# Patient Record
Sex: MALE | Race: WHITE | NOT HISPANIC OR LATINO | Employment: OTHER | URBAN - METROPOLITAN AREA
[De-identification: names, ages, dates, MRNs, and addresses within clinical notes are randomized per-mention and may not be internally consistent; named-entity substitution may affect disease eponyms.]

---

## 2023-03-10 PROBLEM — M54.6 DORSALGIA OF CERVICOTHORACIC REGION: Status: ACTIVE | Noted: 2023-03-10

## 2023-03-10 PROBLEM — R22.32 SUBCUTANEOUS MASS OF FINGER OF LEFT HAND: Status: ACTIVE | Noted: 2023-03-10

## 2023-03-10 PROBLEM — M99.07 SEGMENTAL AND SOMATIC DYSFUNCTION OF UPPER EXTREMITY: Status: ACTIVE | Noted: 2023-03-10

## 2023-03-10 PROBLEM — R42 LIGHTHEADEDNESS: Status: ACTIVE | Noted: 2023-03-10

## 2023-03-10 PROBLEM — M54.2 DORSALGIA OF CERVICOTHORACIC REGION: Status: ACTIVE | Noted: 2023-03-10

## 2023-03-10 PROBLEM — G25.81 RLS (RESTLESS LEGS SYNDROME): Status: ACTIVE | Noted: 2023-03-10

## 2023-03-10 PROBLEM — F41.9 ANXIETY: Status: ACTIVE | Noted: 2023-03-10

## 2023-03-10 PROBLEM — R53.83 MALAISE AND FATIGUE: Status: ACTIVE | Noted: 2023-03-10

## 2023-03-10 PROBLEM — M79.645 PAIN OF FINGER OF LEFT HAND: Status: ACTIVE | Noted: 2023-03-10

## 2023-03-10 PROBLEM — M67.441 GANGLION CYST OF FINGER OF RIGHT HAND: Status: ACTIVE | Noted: 2023-03-10

## 2023-03-10 PROBLEM — G47.39 COMPLEX SLEEP APNEA SYNDROME: Status: ACTIVE | Noted: 2023-03-10

## 2023-03-10 PROBLEM — R09.A2 GLOBUS SENSATION: Status: ACTIVE | Noted: 2023-03-10

## 2023-03-10 PROBLEM — R20.2 TINGLING SENSATION IN FACE: Status: ACTIVE | Noted: 2023-03-10

## 2023-03-10 PROBLEM — M99.08 SEGMENTAL AND SOMATIC DYSFUNCTION OF RIB CAGE: Status: ACTIVE | Noted: 2023-03-10

## 2023-03-10 PROBLEM — J30.2 SEASONAL ALLERGIES: Status: ACTIVE | Noted: 2023-03-10

## 2023-03-10 PROBLEM — R53.81 MALAISE AND FATIGUE: Status: ACTIVE | Noted: 2023-03-10

## 2023-03-10 PROBLEM — J32.9 RHINOSINUSITIS: Status: ACTIVE | Noted: 2023-03-10

## 2023-03-10 PROBLEM — M99.02 SEGMENTAL AND SOMATIC DYSFUNCTION OF THORACIC REGION: Status: ACTIVE | Noted: 2023-03-10

## 2023-03-10 PROBLEM — E55.9 VITAMIN D DEFICIENCY: Status: ACTIVE | Noted: 2023-03-10

## 2023-03-10 PROBLEM — R07.89 CHEST PRESSURE: Status: ACTIVE | Noted: 2023-03-10

## 2023-03-10 PROBLEM — E53.8 VITAMIN B12 DEFICIENCY: Status: ACTIVE | Noted: 2023-03-10

## 2023-03-10 PROBLEM — R40.0 DAYTIME SOMNOLENCE: Status: ACTIVE | Noted: 2023-03-10

## 2023-03-10 PROBLEM — G47.31 COMPLEX SLEEP APNEA SYNDROME: Status: ACTIVE | Noted: 2023-03-10

## 2023-03-10 PROBLEM — R51.9 CHRONIC DAILY HEADACHE: Status: ACTIVE | Noted: 2023-03-10

## 2023-03-10 PROBLEM — G47.33 OBSTRUCTIVE SLEEP APNEA: Status: ACTIVE | Noted: 2023-03-10

## 2023-03-10 PROBLEM — F43.9 STRESS: Status: ACTIVE | Noted: 2023-03-10

## 2023-03-10 PROBLEM — R42 DIZZINESS: Status: ACTIVE | Noted: 2023-03-10

## 2023-03-10 PROBLEM — I48.0 PAROXYSMAL ATRIAL FIBRILLATION (MULTI): Status: ACTIVE | Noted: 2023-03-10

## 2023-03-10 PROBLEM — R51.9 PRESSURE IN HEAD: Status: ACTIVE | Noted: 2023-03-10

## 2023-03-10 PROBLEM — F41.0 PANIC ATTACKS: Status: ACTIVE | Noted: 2023-03-10

## 2023-03-10 PROBLEM — K21.9 GASTROESOPHAGEAL REFLUX DISEASE: Status: ACTIVE | Noted: 2023-03-10

## 2023-03-10 PROBLEM — R00.2 PALPITATIONS: Status: ACTIVE | Noted: 2023-03-10

## 2023-03-10 PROBLEM — R94.31 ABNORMAL EKG: Status: ACTIVE | Noted: 2023-03-10

## 2023-03-10 PROBLEM — R73.03 PREDIABETES: Status: ACTIVE | Noted: 2023-03-10

## 2023-03-10 PROBLEM — E06.3 HASHIMOTO'S THYROIDITIS: Status: ACTIVE | Noted: 2023-03-10

## 2023-03-10 PROBLEM — B35.1 ONYCHOMYCOSIS: Status: ACTIVE | Noted: 2023-03-10

## 2023-03-10 PROBLEM — R13.19 ESOPHAGEAL DYSPHAGIA: Status: ACTIVE | Noted: 2023-03-10

## 2023-03-10 RX ORDER — OMEPRAZOLE 40 MG/1
40 CAPSULE, DELAYED RELEASE ORAL 2 TIMES DAILY
COMMUNITY
End: 2023-12-19

## 2023-03-10 RX ORDER — LEVOTHYROXINE SODIUM 112 UG/1
112 TABLET ORAL DAILY
COMMUNITY
End: 2023-03-16 | Stop reason: SDUPTHER

## 2023-03-10 RX ORDER — CICLOPIROX 80 MG/ML
SOLUTION TOPICAL DAILY
COMMUNITY
Start: 2022-03-21 | End: 2023-09-25 | Stop reason: ALTCHOICE

## 2023-03-13 ENCOUNTER — TELEPHONE (OUTPATIENT)
Dept: PRIMARY CARE | Facility: CLINIC | Age: 53
End: 2023-03-13
Payer: MEDICAID

## 2023-03-13 NOTE — TELEPHONE ENCOUNTER
Spoke to patient. He sees you on 3/16 but wanted to know if you are willing to order a celiac disease panel? He would like to try and get it before seeing you but if he can't he is willing to discuss at visit. Dr Hoff said it may be a good idea to order but patient was told to ask us about it

## 2023-03-16 ENCOUNTER — OFFICE VISIT (OUTPATIENT)
Dept: PRIMARY CARE | Facility: CLINIC | Age: 53
End: 2023-03-16
Payer: MEDICAID

## 2023-03-16 VITALS
SYSTOLIC BLOOD PRESSURE: 118 MMHG | HEIGHT: 70 IN | TEMPERATURE: 97.5 F | WEIGHT: 202 LBS | BODY MASS INDEX: 28.92 KG/M2 | DIASTOLIC BLOOD PRESSURE: 78 MMHG | HEART RATE: 95 BPM | OXYGEN SATURATION: 95 %

## 2023-03-16 DIAGNOSIS — R14.0 BLOATED ABDOMEN: Primary | ICD-10-CM

## 2023-03-16 DIAGNOSIS — E55.9 VITAMIN D DEFICIENCY: ICD-10-CM

## 2023-03-16 DIAGNOSIS — E06.3 HASHIMOTO'S THYROIDITIS: ICD-10-CM

## 2023-03-16 PROCEDURE — 99214 OFFICE O/P EST MOD 30 MIN: CPT | Performed by: FAMILY MEDICINE

## 2023-03-16 PROCEDURE — 1036F TOBACCO NON-USER: CPT | Performed by: FAMILY MEDICINE

## 2023-03-16 PROCEDURE — 3008F BODY MASS INDEX DOCD: CPT | Performed by: FAMILY MEDICINE

## 2023-03-16 RX ORDER — LEVOTHYROXINE SODIUM 112 UG/1
112 TABLET ORAL DAILY
Qty: 30 TABLET | Refills: 3 | Status: SHIPPED | OUTPATIENT
Start: 2023-03-16 | End: 2023-07-10

## 2023-03-16 RX ORDER — ERGOCALCIFEROL 1.25 MG/1
50000 CAPSULE ORAL
COMMUNITY
Start: 2022-12-14 | End: 2023-03-16 | Stop reason: SDUPTHER

## 2023-03-16 RX ORDER — ERGOCALCIFEROL 1.25 MG/1
50000 CAPSULE ORAL
Qty: 30 CAPSULE | Refills: 3 | Status: SHIPPED | OUTPATIENT
Start: 2023-03-16 | End: 2024-05-28

## 2023-03-16 ASSESSMENT — PATIENT HEALTH QUESTIONNAIRE - PHQ9
1. LITTLE INTEREST OR PLEASURE IN DOING THINGS: NOT AT ALL
SUM OF ALL RESPONSES TO PHQ9 QUESTIONS 1 AND 2: 0
2. FEELING DOWN, DEPRESSED OR HOPELESS: NOT AT ALL

## 2023-03-16 NOTE — PROGRESS NOTES
Subjective   Patient ID: Julian Juarez is a 52 y.o. male who presents for Follow-up (Had tests done and still having problems, talk about some tests and having bloodwork done. refills).  HPI  Reviewed CT angiogram, Colonoscopy, EGD and Barium swallow.   Getting bloating. Worse after pizza.    Taking acid reflux medication.    Omeprazole 40 mg and told to take 2 x week  Review of Systems    Objective   Physical Exam    Assessment/Plan   Problem List Items Addressed This Visit    None

## 2023-03-18 ENCOUNTER — LAB (OUTPATIENT)
Dept: LAB | Facility: LAB | Age: 53
End: 2023-03-18
Payer: MEDICAID

## 2023-03-18 DIAGNOSIS — R14.0 BLOATED ABDOMEN: ICD-10-CM

## 2023-03-18 PROCEDURE — 86003 ALLG SPEC IGE CRUDE XTRC EA: CPT

## 2023-03-18 PROCEDURE — 36415 COLL VENOUS BLD VENIPUNCTURE: CPT

## 2023-03-18 PROCEDURE — 86001 ALLERGEN SPECIFIC IGG: CPT

## 2023-03-18 PROCEDURE — 83516 IMMUNOASSAY NONANTIBODY: CPT

## 2023-03-19 LAB
DEAMIDATED GLIADIN PEPTIDE IGA: <1 U/ML (ref 0–14)
DEAMIDATED GLIADIN PEPTIDE IGG: <1 U/ML (ref 0–14)
TISSUE TRANSGLUTAMINASE IGG: <1 U/ML (ref 0–14)
TISSUE TRANSGLUTAMINASE, IGA: <1 U/ML (ref 0–14)

## 2023-03-20 ENCOUNTER — TELEPHONE (OUTPATIENT)
Dept: PRIMARY CARE | Facility: CLINIC | Age: 53
End: 2023-03-20
Payer: MEDICAID

## 2023-03-20 NOTE — TELEPHONE ENCOUNTER
----- Message from Guevara Varghese DO sent at 3/19/2023  7:49 PM EDT -----  Celiac panel negative

## 2023-03-21 LAB
ALLERGEN FOOD: WHEAT (TRITICUM AESTIVUM) IGE (KU/L): <0.1 KU/L
IMMUNOCAP INTERPRETATION: NORMAL

## 2023-03-22 LAB — Lab: 24.3 MCG/ML

## 2023-07-09 DIAGNOSIS — E06.3 HASHIMOTO'S THYROIDITIS: ICD-10-CM

## 2023-07-10 RX ORDER — LEVOTHYROXINE SODIUM 112 UG/1
112 TABLET ORAL DAILY
Qty: 30 TABLET | Refills: 11 | Status: SHIPPED | OUTPATIENT
Start: 2023-07-10 | End: 2024-07-09

## 2023-09-18 PROBLEM — R63.4 WEIGHT LOSS: Status: ACTIVE | Noted: 2023-09-18

## 2023-09-18 PROBLEM — M79.673 FOOT PAIN: Status: ACTIVE | Noted: 2023-09-18

## 2023-09-18 PROBLEM — M72.2 PLANTAR FASCIITIS: Status: ACTIVE | Noted: 2023-09-18

## 2023-09-18 PROBLEM — K30 FUNCTIONAL DYSPEPSIA: Status: ACTIVE | Noted: 2023-09-18

## 2023-09-18 PROBLEM — M94.0 COSTOCHONDRITIS: Status: ACTIVE | Noted: 2023-09-18

## 2023-09-18 PROBLEM — R90.89 ABNORMAL BRAIN MRI: Status: ACTIVE | Noted: 2023-09-18

## 2023-09-18 PROBLEM — R68.81 EARLY SATIETY: Status: ACTIVE | Noted: 2023-09-18

## 2023-09-18 PROBLEM — M77.30 CALCANEAL SPUR: Status: ACTIVE | Noted: 2023-09-18

## 2023-09-18 PROBLEM — R10.13 EPIGASTRIC PAIN: Status: ACTIVE | Noted: 2023-09-18

## 2023-09-18 PROBLEM — R07.89 CHEST PAIN, MUSCULOSKELETAL: Status: ACTIVE | Noted: 2023-09-18

## 2023-09-18 RX ORDER — NAPROXEN 500 MG/1
1 TABLET ORAL 2 TIMES DAILY
COMMUNITY
Start: 2023-02-27 | End: 2023-09-25 | Stop reason: ALTCHOICE

## 2023-09-18 RX ORDER — METOPROLOL SUCCINATE 50 MG/1
TABLET, EXTENDED RELEASE ORAL
COMMUNITY
Start: 2023-02-09 | End: 2023-09-25 | Stop reason: ALTCHOICE

## 2023-09-25 ENCOUNTER — OFFICE VISIT (OUTPATIENT)
Dept: PRIMARY CARE | Facility: CLINIC | Age: 53
End: 2023-09-25
Payer: MEDICAID

## 2023-09-25 VITALS
HEART RATE: 92 BPM | SYSTOLIC BLOOD PRESSURE: 124 MMHG | OXYGEN SATURATION: 95 % | BODY MASS INDEX: 30.21 KG/M2 | DIASTOLIC BLOOD PRESSURE: 68 MMHG | HEIGHT: 70 IN | WEIGHT: 211 LBS

## 2023-09-25 DIAGNOSIS — E55.9 VITAMIN D DEFICIENCY: ICD-10-CM

## 2023-09-25 DIAGNOSIS — R73.03 PREDIABETES: ICD-10-CM

## 2023-09-25 DIAGNOSIS — Z00.00 WELL ADULT EXAM: Primary | ICD-10-CM

## 2023-09-25 DIAGNOSIS — E06.3 HASHIMOTO'S THYROIDITIS: ICD-10-CM

## 2023-09-25 DIAGNOSIS — F41.9 ANXIETY: ICD-10-CM

## 2023-09-25 PROCEDURE — 99396 PREV VISIT EST AGE 40-64: CPT | Performed by: FAMILY MEDICINE

## 2023-09-25 PROCEDURE — 3008F BODY MASS INDEX DOCD: CPT | Performed by: FAMILY MEDICINE

## 2023-09-25 PROCEDURE — 1036F TOBACCO NON-USER: CPT | Performed by: FAMILY MEDICINE

## 2023-09-25 ASSESSMENT — PATIENT HEALTH QUESTIONNAIRE - PHQ9
SUM OF ALL RESPONSES TO PHQ9 QUESTIONS 1 AND 2: 0
1. LITTLE INTEREST OR PLEASURE IN DOING THINGS: NOT AT ALL
2. FEELING DOWN, DEPRESSED OR HOPELESS: NOT AT ALL

## 2023-09-25 NOTE — PROGRESS NOTES
Subjective   Patient ID: Julian Juarez is a 53 y.o. male who presents for well exam  HPI  COVID about a month ago.    Concerns today:  Wart right hand    In general the patient states that his health is:  Good.     Regular dental visits: Yes and getting scheduled.    Vision problems: No changes in the vision  Hearing loss:  No changes    Diet:  He is trying to get back to a good diet  Exercise:  Has been doing a little  Weight concerns:  yes  Sleep:  Yes  Caffeine:  None  Water: Water intake is good    Tobacco use:  Alcohol use:  Illicit drug use:    Reproductive health:  Sexually active: yes  Urinary difficulties: none.  Sees urology Dr Lujan    Colon cancer screening:  Last done: 2023  Colonoscopy     10 year follow up    Review of Systems    Objective   Physical Exam  CN II general: Patient is alert and oriented ×3 and appears in no acute distress. No respiratory distress.    Head: Atraumatic normocephalic.    Eyes: EOMI, PERRLA      Ears: Canals patent without any irritation, tympanic membranes without inflammation, no swelling, normal light reflex.    Nose: Nares patent. Turbinates are not swollen. No discharge.    Mouth: Normal mucosa. Moist. No erythema, exudates, tonsillar enlargement.    Neck: Normal range of motion, no masses.  Thyroid is palpable and normal in size without any nodules. No anterior cervical or posterior cervical adenopathy.    Heart: Regular rate and rhythm, no murmurs clicks or gallops    Lungs: Clear to auscultation bilaterally without any rhonchi rales or wheezing, lung sounds heard throughout all lung fields    Abdomen: Soft, nontender, no rigidity, rebound, guarding or organomegaly. Bowel sounds ×4 quadrants.    Musculoskeletal: Normal range of motion, strength is grossly intact in the proximal distal muscles of the upper and lower extremities bilaterally, deep tendon reflexes +2 out of 4 and symmetric bilaterally at the patella, Achilles, biceps, triceps, sensation  intact.    Nerves: Cranial nerves II through XII appear grossly intact and without deficit    Skin: Intact, dry, no rashes or erythema    Psych: Normal affect.   Assessment/Plan   Problem List Items Addressed This Visit       Anxiety    Relevant Orders    Referral to [facility]    Hashimoto's thyroiditis    Relevant Orders    TSH with reflex to Free T4 if abnormal    Prediabetes    Relevant Orders    CBC and Auto Differential    Comprehensive Metabolic Panel    Lipid Panel    Vitamin B12    Vitamin D deficiency    Relevant Orders    Vitamin D 1,25 Dihydroxy (for eval of hypercalcemia)     Other Visit Diagnoses       Well adult exam    -  Primary        Cardiology Dr. Jessica - [facility]  Urology Dr. Lujan- Yearly prostate exams and hematuria follow up.  Has couple small kidney stones.    GI: [place] - Functional dyspepsia.  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  Just following  Wart on the right hand.  - Use baby aspirin nightly.  Crush and add a driop of water to make a paste

## 2023-09-27 ENCOUNTER — LAB (OUTPATIENT)
Dept: LAB | Facility: LAB | Age: 53
End: 2023-09-27
Payer: MEDICAID

## 2023-09-27 DIAGNOSIS — E55.9 VITAMIN D DEFICIENCY: ICD-10-CM

## 2023-09-27 DIAGNOSIS — E06.3 HASHIMOTO'S THYROIDITIS: ICD-10-CM

## 2023-09-27 DIAGNOSIS — R73.03 PREDIABETES: ICD-10-CM

## 2023-09-27 LAB
ALANINE AMINOTRANSFERASE (SGPT) (U/L) IN SER/PLAS: 19 U/L (ref 10–52)
ALBUMIN (G/DL) IN SER/PLAS: 4.2 G/DL (ref 3.4–5)
ALKALINE PHOSPHATASE (U/L) IN SER/PLAS: 59 U/L (ref 33–120)
ANION GAP IN SER/PLAS: 12 MMOL/L (ref 10–20)
ASPARTATE AMINOTRANSFERASE (SGOT) (U/L) IN SER/PLAS: 17 U/L (ref 9–39)
BASOPHILS (10*3/UL) IN BLOOD BY AUTOMATED COUNT: 0.05 X10E9/L (ref 0–0.1)
BASOPHILS/100 LEUKOCYTES IN BLOOD BY AUTOMATED COUNT: 1.1 % (ref 0–2)
BILIRUBIN TOTAL (MG/DL) IN SER/PLAS: 0.8 MG/DL (ref 0–1.2)
CALCIUM (MG/DL) IN SER/PLAS: 8.8 MG/DL (ref 8.6–10.3)
CARBON DIOXIDE, TOTAL (MMOL/L) IN SER/PLAS: 26 MMOL/L (ref 21–32)
CHLORIDE (MMOL/L) IN SER/PLAS: 103 MMOL/L (ref 98–107)
CHOLESTEROL (MG/DL) IN SER/PLAS: 199 MG/DL (ref 0–199)
CHOLESTEROL IN HDL (MG/DL) IN SER/PLAS: 66.1 MG/DL
CHOLESTEROL/HDL RATIO: 3
COBALAMIN (VITAMIN B12) (PG/ML) IN SER/PLAS: 542 PG/ML (ref 211–911)
CREATININE (MG/DL) IN SER/PLAS: 0.92 MG/DL (ref 0.5–1.3)
EOSINOPHILS (10*3/UL) IN BLOOD BY AUTOMATED COUNT: 0.06 X10E9/L (ref 0–0.7)
EOSINOPHILS/100 LEUKOCYTES IN BLOOD BY AUTOMATED COUNT: 1.3 % (ref 0–6)
ERYTHROCYTE DISTRIBUTION WIDTH (RATIO) BY AUTOMATED COUNT: 12.5 % (ref 11.5–14.5)
ERYTHROCYTE MEAN CORPUSCULAR HEMOGLOBIN CONCENTRATION (G/DL) BY AUTOMATED: 33.3 G/DL (ref 32–36)
ERYTHROCYTE MEAN CORPUSCULAR VOLUME (FL) BY AUTOMATED COUNT: 90 FL (ref 80–100)
ERYTHROCYTES (10*6/UL) IN BLOOD BY AUTOMATED COUNT: 5.03 X10E12/L (ref 4.5–5.9)
GFR MALE: >90 ML/MIN/1.73M2
GLUCOSE (MG/DL) IN SER/PLAS: 98 MG/DL (ref 74–99)
HEMATOCRIT (%) IN BLOOD BY AUTOMATED COUNT: 45.1 % (ref 41–52)
HEMOGLOBIN (G/DL) IN BLOOD: 15 G/DL (ref 13.5–17.5)
IMMATURE GRANULOCYTES/100 LEUKOCYTES IN BLOOD BY AUTOMATED COUNT: 0.9 % (ref 0–0.9)
LDL: 113 MG/DL (ref 0–99)
LEUKOCYTES (10*3/UL) IN BLOOD BY AUTOMATED COUNT: 4.5 X10E9/L (ref 4.4–11.3)
LYMPHOCYTES (10*3/UL) IN BLOOD BY AUTOMATED COUNT: 1.64 X10E9/L (ref 1.2–4.8)
LYMPHOCYTES/100 LEUKOCYTES IN BLOOD BY AUTOMATED COUNT: 36.9 % (ref 13–44)
MONOCYTES (10*3/UL) IN BLOOD BY AUTOMATED COUNT: 0.38 X10E9/L (ref 0.1–1)
MONOCYTES/100 LEUKOCYTES IN BLOOD BY AUTOMATED COUNT: 8.5 % (ref 2–10)
NEUTROPHILS (10*3/UL) IN BLOOD BY AUTOMATED COUNT: 2.28 X10E9/L (ref 1.2–7.7)
NEUTROPHILS/100 LEUKOCYTES IN BLOOD BY AUTOMATED COUNT: 51.3 % (ref 40–80)
PLATELETS (10*3/UL) IN BLOOD AUTOMATED COUNT: 357 X10E9/L (ref 150–450)
POTASSIUM (MMOL/L) IN SER/PLAS: 4 MMOL/L (ref 3.5–5.3)
PROTEIN TOTAL: 6.4 G/DL (ref 6.4–8.2)
SODIUM (MMOL/L) IN SER/PLAS: 137 MMOL/L (ref 136–145)
THYROTROPIN (MIU/L) IN SER/PLAS BY DETECTION LIMIT <= 0.05 MIU/L: 2.48 MIU/L (ref 0.44–3.98)
TRIGLYCERIDE (MG/DL) IN SER/PLAS: 101 MG/DL (ref 0–149)
UREA NITROGEN (MG/DL) IN SER/PLAS: 14 MG/DL (ref 6–23)
VLDL: 20 MG/DL (ref 0–40)

## 2023-09-27 PROCEDURE — 82607 VITAMIN B-12: CPT

## 2023-09-27 PROCEDURE — 84443 ASSAY THYROID STIM HORMONE: CPT

## 2023-09-27 PROCEDURE — 80061 LIPID PANEL: CPT

## 2023-09-27 PROCEDURE — 80053 COMPREHEN METABOLIC PANEL: CPT

## 2023-09-27 PROCEDURE — 82652 VIT D 1 25-DIHYDROXY: CPT

## 2023-09-27 PROCEDURE — 85025 COMPLETE CBC W/AUTO DIFF WBC: CPT

## 2023-09-27 PROCEDURE — 36415 COLL VENOUS BLD VENIPUNCTURE: CPT

## 2023-09-29 LAB — VITAMIN D 1,25-DIHYDROXY: 41.9 PG/ML (ref 19.9–79.3)

## 2023-10-02 ENCOUNTER — TELEPHONE (OUTPATIENT)
Dept: PRIMARY CARE | Facility: CLINIC | Age: 53
End: 2023-10-02
Payer: MEDICAID

## 2023-10-24 ENCOUNTER — TELEPHONE (OUTPATIENT)
Dept: CARDIOLOGY | Facility: CLINIC | Age: 53
End: 2023-10-24
Payer: MEDICAID

## 2023-10-24 DIAGNOSIS — I48.0 PAROXYSMAL ATRIAL FIBRILLATION (MULTI): Primary | ICD-10-CM

## 2023-10-24 NOTE — TELEPHONE ENCOUNTER
Patient called in with concern that he may be going in to afib at night during sleep. He states it wakes him up. Per Dr. Miller, the patient will have a 14 day monitor with follow up in office with results. The patient is agreeable to this plan. He will be in tomorrow for monitor placement and follow up with Dr. Miller 11/15.

## 2023-10-25 ENCOUNTER — APPOINTMENT (OUTPATIENT)
Dept: CARDIOLOGY | Facility: CLINIC | Age: 53
End: 2023-10-25
Payer: MEDICAID

## 2023-10-25 DIAGNOSIS — I48.91 ATRIAL FIBRILLATION, UNSPECIFIED TYPE (MULTI): ICD-10-CM

## 2023-10-26 ENCOUNTER — CLINICAL SUPPORT (OUTPATIENT)
Dept: CARDIOLOGY | Facility: CLINIC | Age: 53
End: 2023-10-26
Payer: MEDICAID

## 2023-10-26 DIAGNOSIS — I48.0 PAROXYSMAL ATRIAL FIBRILLATION (MULTI): ICD-10-CM

## 2023-11-09 ENCOUNTER — APPOINTMENT (OUTPATIENT)
Dept: CARDIOLOGY | Facility: CLINIC | Age: 53
End: 2023-11-09
Payer: MEDICAID

## 2023-11-10 ENCOUNTER — CLINICAL SUPPORT (OUTPATIENT)
Dept: CARDIOLOGY | Facility: CLINIC | Age: 53
End: 2023-11-10
Payer: MEDICAID

## 2023-11-10 VITALS — SYSTOLIC BLOOD PRESSURE: 138 MMHG | DIASTOLIC BLOOD PRESSURE: 100 MMHG

## 2023-11-10 DIAGNOSIS — I48.91 ATRIAL FIBRILLATION, UNSPECIFIED TYPE (MULTI): ICD-10-CM

## 2023-11-10 PROBLEM — E66.811 OBESITY, CLASS I, BMI 30-34.9: Status: ACTIVE | Noted: 2023-07-28

## 2023-11-10 PROBLEM — R73.01 IMPAIRED FASTING GLUCOSE: Status: ACTIVE | Noted: 2018-05-24

## 2023-11-10 PROBLEM — R53.83 FATIGUE: Status: ACTIVE | Noted: 2018-04-30

## 2023-11-10 PROBLEM — E03.9 HYPOTHYROIDISM: Status: ACTIVE | Noted: 2018-04-30

## 2023-11-10 PROBLEM — E66.9 OBESITY, CLASS I, BMI 30-34.9: Status: ACTIVE | Noted: 2023-07-28

## 2023-11-10 PROCEDURE — 93000 ELECTROCARDIOGRAM COMPLETE: CPT | Performed by: INTERNAL MEDICINE

## 2023-11-10 RX ORDER — DILTIAZEM HYDROCHLORIDE 240 MG/1
240 CAPSULE, COATED, EXTENDED RELEASE ORAL DAILY
COMMUNITY
End: 2023-11-16 | Stop reason: WASHOUT

## 2023-11-10 NOTE — PROGRESS NOTES
Patient came in for EKG for Dr. Miller. Patient reports he is awakened at night with a fluttering or gasping for breath. He just finished wearing a holter monitor and turned it in yesterday.   Around 4 am today he was awakened again and his heart rate was 130s-160s for about 30 minutes. He wasn't sure if he was just anxious by being awakened to this or not. He said he has sleep apnea and just started treatment for this. EKG normal sinus rhythm and I gave EKG to Kathie.   Next appointment 11/15/23 with Dr. Miller.  /100

## 2023-11-14 PROBLEM — N32.0 BLADDER NECK OBSTRUCTION: Status: ACTIVE | Noted: 2022-07-22

## 2023-11-14 RX ORDER — PERMETHRIN 50 MG/G
CREAM TOPICAL
COMMUNITY
Start: 2023-10-14 | End: 2023-11-16 | Stop reason: WASHOUT

## 2023-11-14 NOTE — PROGRESS NOTES
[facility] SLEEP MEDICINE CLINIC  FOLLOW-UP VISIT NOTE    Clinic Location: [facility]  Service Date: 11/14/2023  PCP: Guevara Varghese DO    HISTORY OF PRESENT ILLNESS     Patient ID: Julian Juarez is a 53 y.o. male who presents for follow-up  of complex sleep apnea on BPAP-S/T .     Patient is here {pxarrival:87256}.  To review, patient's medical history is notable for ***.     Interval History  Patient was last seen in 8/17/2023. Plan was to  resume PAP use and order for PAP supplies placed .  {Interval sleep history:53091}  He denies symptoms of parasomnias and shift-work disorder.     RLS Followup:   ***    SLEEP STUDY HISTORY (personally reviewed raw data such as interpretation report, data sheet, hypnogram, and titration table if available and applicable)  HSAT 9/21/2020: AHI 14.4, SpO2 nirmal 85%  PAP titration 5/7/2022: CPAP was started at 4-12 cm H2O. Pt was having problems tolerating mask. Due to central apneas more than 50%, BPAP-S/T was attempted at 14/10 cm H2O and BUR 15. Central apneas persisted and occurred during sleep wake transition and post-arousal. No CSB noted but some have presumed ataxic breathing pattern. Intermittent mask leak while on Airtouch N20 nasal mask.     PAP Adherence  A PAP adherence data was obtained and reviewed personally today in clinic. (see scanned document in EPIC)  Source of data: {data source:62342}  Machine: {machine brand:03921}  Setup date: ***  Settings: {machine settings:14485}  Date Range: *** to ***  Days used: ***  >4 hrs usage: ***%  Average usage hours (days used): ***  Pressure: {machine pressures:05611}  Leak: {mask leak:83734}  AHI: *** (RERA index ***, ROBBY ***)    SLEEP-WAKE SCHEDULE  Bedtime: 12 MN daily  Sleep latency: 5-60 mins  Difficulty falling asleep: {Yes/No:45556}  wakings: none unles woke up gaspiing for air  Difficulty staying asleep: {Yes/No:57143}  Final wakeup time: 5:30 AM on Mondays, Wednesdays, and  "Fridays; 7 AM on Tuesdays and Thursdays; 8 AM on weekends  Get out of bed time: 5:30 AM on Mondays, Wednesdays, and Fridays; 7 AM on Tuesdays and Thursdays; 8 AM on weekends  Shift work: no  Naps: no  Average sleep duration (excluding naps): 6-7 hrs    SLEEP ENVIRONMENT  Sleep location: ***  Sleep status: {sleep status:18052}  Preferred sleep position: {Sleep position:15846}  TV in bedroom: {Yes/No:37896}  Room is dark:  {Yes/No:24999::\"Yes\"}  Room is quiet: {Yes/No:92749::\"Yes\"}  Room is cool: {Yes/No:53567::\"Yes\"}    SOCIAL HISTORY  Smoking: no  ETOH: no  Marijuana: no  Caffeine: no  Sleep aids: no    WEIGHT: {weight:97957}    Claustrophobia: Yes     Active Problems, Allergy List, Medication List, and PMH/PSH/FH/Social Hx have been reviewed and reconciled in chart. No significant changes unless documented in the pertinent chart section. Updates made when necessary.     PHYSICAL EXAM     VITAL SIGNS: There were no vitals taken for this visit.    NECK CIRCUMFERENCE: ***    Today ESS: ***  Last visit ESS: 12  NIMESH: 5    Physical Exam  Constitutional: Awake, not in distress  Head: normocephalic, atraumatic  Skin: Warm, ***no rash  Neuro: No tremors, moves all extremities  Psych: alert and oriented to time, place, and person    RESULTS/DATA     No results found for: \"IRON\", \"TRANSFERRIN\", \"IRONSAT\", \"TIBC\", \"FERRITIN\"    Bicarbonate   Date Value Ref Range Status   09/27/2023 26 21 - 32 mmol/L Final       ASSESSMENT/PLAN     Assessment/Plan   {Assess/PlanSmartLinks:33194}    All of patient's questions were answered. He verbalizes understanding and agreement with my assessment and plan. Refer to after-visit-summary (AVS) for patient education and more details on troubleshooting issues with PAP usage, proper cleaning instructions of PAP supplies, and usual recommended replacement schedule for each of the PAP supplies.     {EWFOLLOW-UP:21719}  "

## 2023-11-15 ENCOUNTER — OFFICE VISIT (OUTPATIENT)
Dept: CARDIOLOGY | Facility: CLINIC | Age: 53
End: 2023-11-15
Payer: MEDICAID

## 2023-11-15 VITALS
HEIGHT: 70 IN | BODY MASS INDEX: 32.35 KG/M2 | WEIGHT: 226 LBS | HEART RATE: 95 BPM | DIASTOLIC BLOOD PRESSURE: 74 MMHG | SYSTOLIC BLOOD PRESSURE: 120 MMHG

## 2023-11-15 DIAGNOSIS — R00.2 PALPITATIONS: Primary | ICD-10-CM

## 2023-11-15 PROCEDURE — 93000 ELECTROCARDIOGRAM COMPLETE: CPT | Performed by: INTERNAL MEDICINE

## 2023-11-15 PROCEDURE — 1036F TOBACCO NON-USER: CPT | Performed by: INTERNAL MEDICINE

## 2023-11-15 PROCEDURE — 99214 OFFICE O/P EST MOD 30 MIN: CPT | Performed by: INTERNAL MEDICINE

## 2023-11-15 PROCEDURE — 3008F BODY MASS INDEX DOCD: CPT | Performed by: INTERNAL MEDICINE

## 2023-11-15 NOTE — PROGRESS NOTES
[facility] Heart and Vascular Electrophysiology    Patient Name: Julian Juarez  Patient : 1970    Referred  for   Chief Complaint   Patient presents with    Follow-up        Julian Juarez is a 53 y.o. year old male patient with:    1. Paroxysmal atrial fibrillation: For the past 15 years. Around 10-15 episodes in that time. Episodes last between 5 minutes to 8 hours. Usually occur at night or early in the morning. Feels palpitations, fluttering in the heart and some lightheadedness. Was treated with cardizem for years, but not consistently. Episodes continue to be infrequent. CHADSVasc: 0     2. Mild obstructive sleep apnea        Past Medical History:  He has a past medical history of Dizziness and giddiness (2022), Personal history of other diseases of the circulatory system, and Personal history of other endocrine, nutritional and metabolic disease.    Past Surgical History:  He has a past surgical history that includes CT angio coronary art with heartflow if score >30% (2022) and CT angio coronary art with heartflow if score >30% (2023).      Social History:  He reports that he has never smoked. He has never used smokeless tobacco. He reports that he does not currently use alcohol. He reports that he does not use drugs.    Family History:  Family History   Problem Relation Name Age of Onset    Diabetes Mother      Other (bilateral renal calculus) Mother      Sleep apnea Brother      Diabetes Paternal Grandmother      Other (bilateral renal calculus) Paternal Grandmother          Allergies:  Patient has no known allergies.    Outpatient Medications:  Current Outpatient Medications   Medication Instructions    ergocalciferol (VITAMIN D-2) 50,000 Units, oral, Weekly    levothyroxine (SYNTHROID, LEVOXYL) 112 mcg, oral, Daily    omeprazole (PRILOSEC) 40 mg, oral, 2 times daily        ROS:  A 14 point review of systems was done and is negative other than as stated in  "HPI    Vitals:  Vitals:    11/15/23 1422   BP: 120/74   Pulse: 95   Weight: 103 kg (226 lb)   Height: 1.778 m (5' 10\")       Physical Exam:   Physical Exam  Constitutional:       General: He is not in acute distress.     Appearance: Normal appearance.   Cardiovascular:      Rate and Rhythm: Normal rate and regular rhythm.   Musculoskeletal:         General: No swelling.   Skin:     General: Skin is warm and dry.   Neurological:      Mental Status: He is alert.          Intake/Output:   No intake/output data recorded.    Outpatient Medications  Current Outpatient Medications on File Prior to Visit   Medication Sig Dispense Refill    ergocalciferol (Vitamin D-2) 1.25 MG (09695 UT) capsule Take 1 capsule (50,000 Units) by mouth 1 (one) time per week. 30 capsule 3    levothyroxine (Synthroid, Levoxyl) 112 mcg tablet Take 1 tablet (112 mcg) by mouth once daily. 30 tablet 11    omeprazole (PriLOSEC) 40 mg DR capsule Take 1 capsule (40 mg) by mouth 2 times a day.       No current facility-administered medications on file prior to visit.       Labs: (past 26 weeks)  Recent Results (from the past 4368 hour(s))   CBC and Auto Differential    Collection Time: 09/27/23 10:15 AM   Result Value Ref Range    WBC 4.5 4.4 - 11.3 x10E9/L    RBC 5.03 4.50 - 5.90 x10E12/L    Hemoglobin 15.0 13.5 - 17.5 g/dL    Hematocrit 45.1 41.0 - 52.0 %    MCV 90 80 - 100 fL    MCHC 33.3 32.0 - 36.0 g/dL    Platelets 357 150 - 450 x10E9/L    RDW 12.5 11.5 - 14.5 %    Neutrophils % 51.3 40.0 - 80.0 %    Immature Granulocytes %, Automated 0.9 0.0 - 0.9 %    Lymphocytes % 36.9 13.0 - 44.0 %    Monocytes % 8.5 2.0 - 10.0 %    Eosinophils % 1.3 0.0 - 6.0 %    Basophils % 1.1 0.0 - 2.0 %    Neutrophils Absolute 2.28 1.20 - 7.70 x10E9/L    Lymphocytes Absolute 1.64 1.20 - 4.80 x10E9/L    Monocytes Absolute 0.38 0.10 - 1.00 x10E9/L    Eosinophils Absolute 0.06 0.00 - 0.70 x10E9/L    Basophils Absolute 0.05 0.00 - 0.10 x10E9/L   Comprehensive Metabolic Panel "    Collection Time: 09/27/23 10:15 AM   Result Value Ref Range    Glucose 98 74 - 99 mg/dL    Sodium 137 136 - 145 mmol/L    Potassium 4.0 3.5 - 5.3 mmol/L    Chloride 103 98 - 107 mmol/L    Bicarbonate 26 21 - 32 mmol/L    Anion Gap 12 10 - 20 mmol/L    Urea Nitrogen 14 6 - 23 mg/dL    Creatinine 0.92 0.50 - 1.30 mg/dL    GFR MALE >90 >90 mL/min/1.73m2    Calcium 8.8 8.6 - 10.3 mg/dL    Albumin 4.2 3.4 - 5.0 g/dL    Alkaline Phosphatase 59 33 - 120 U/L    Total Protein 6.4 6.4 - 8.2 g/dL    AST 17 9 - 39 U/L    Total Bilirubin 0.8 0.0 - 1.2 mg/dL    ALT (SGPT) 19 10 - 52 U/L   Lipid Panel    Collection Time: 09/27/23 10:15 AM   Result Value Ref Range    Cholesterol 199 0 - 199 mg/dL    HDL 66.1 mg/dL    Cholesterol/HDL Ratio 3.0      (H) 0 - 99 mg/dL    VLDL 20 0 - 40 mg/dL    Triglycerides 101 0 - 149 mg/dL   TSH with reflex to Free T4 if abnormal    Collection Time: 09/27/23 10:15 AM   Result Value Ref Range    TSH 2.48 0.44 - 3.98 mIU/L   Vitamin B12    Collection Time: 09/27/23 10:15 AM   Result Value Ref Range    Vitamin B-12 542 211 - 911 pg/mL   Vitamin D 1,25 Dihydroxy (for eval of hypercalcemia)    Collection Time: 09/27/23 10:15 AM   Result Value Ref Range    Vit D, 1,25-Dihydroxy 41.9 19.9 - 79.3 pg/mL   Transthoracic Echo (TTE) Complete    Collection Time: 12/15/23  8:24 AM   Result Value Ref Range    AV pk carly 0.90     LVOT diam 2.28     LV biplane EF 56     MV avg E/e' ratio 5.14     MV E/A ratio 0.91     LA vol index A/L 26.6     Tricuspid annular plane systolic excursion 2.3     RV free wall pk S' 13.00     LVIDd 4.94     RVSP 13.6     Aortic Valve Area by Continuity of Peak Velocity 3.54     AV pk grad 3.2     LV A4C EF 53.0        ECG  Encounter Date: 11/15/23   ECG 12 lead (Clinic Performed)    Narrative    See scanned report       Echocardiogram  No results found for this or any previous visit from the past 1095 days.      Assessment/Plan:   Patient claims he continues to have occasional  episodes of palpitations only at night which usually wake him up. At times he feels his heart pounding heavily but also at times it feels like his heart racing. Reviewed event monitor, some PAC's noted. Also noted somewhat elevated HR during the night ~90-100bpm. Appears to be sinus tachycardia. No AF detected. Patient scheduled to receive new CPAP equipment. He will call us to see how his palpitations are doing after he's on CPAP for a few weeks. He will continue to follow with Dr. Jessica.

## 2023-11-16 ENCOUNTER — APPOINTMENT (OUTPATIENT)
Dept: SLEEP MEDICINE | Facility: CLINIC | Age: 53
End: 2023-11-16
Payer: MEDICAID

## 2023-11-20 ENCOUNTER — TELEPHONE (OUTPATIENT)
Dept: CARDIOLOGY | Facility: CLINIC | Age: 53
End: 2023-11-20
Payer: MEDICAID

## 2023-11-20 NOTE — TELEPHONE ENCOUNTER
Next appointment with Dr. Jessica 2/19/24  Last appointment: 2/9/23 with Dr. Jessica and [facility] is significant for anxiety, GERD, Hashimoto's thyroiditis, mild SHERMAN, palpitations, and paroxysmal a-fib  Testing    02/02/2022 - TTE  The left ventricular systolic function is normal with a 55% estimated ejection fraction.     01/31/2022 - CTA Coronary Arteries  Normal coronary anatomy without evidence of atherosclerotic changes or stenotic disease.    Recently saw Dr. Miller 11/15/23    **I called and spoke with patient and he reports he jogged up about 5 flights of stairs and when he got to the top he was winded and took 1-2 minutes to go back to baseline. He didn't have chest pain, pressure, or heaviness. He doesn't get shortness of breath with other daily activities but admits he may be out of shape. He doesn't normally go up 5 flights, so I recommended to continue to monitor for symptoms and call back with any new symptoms or if he notices shortness of breath progressing. He verbalized understanding.

## 2023-11-20 NOTE — TELEPHONE ENCOUNTER
----- Message from Maria R Abarca sent at 11/20/2023 10:59 AM EST -----  Patient called concerned that he went up some steps today .He states he was going pretty fast but he was winded after and it made him a little uneasy. Could you call him to let him know if this is normal or if he should be seen. His number is [phone]. Thank you.

## 2023-12-06 ENCOUNTER — TELEPHONE (OUTPATIENT)
Dept: CARDIOLOGY | Facility: CLINIC | Age: 53
End: 2023-12-06
Payer: MEDICAID

## 2023-12-06 DIAGNOSIS — R06.02 SHORTNESS OF BREATH: Primary | ICD-10-CM

## 2023-12-08 ENCOUNTER — HOSPITAL ENCOUNTER (OUTPATIENT)
Dept: RADIOLOGY | Facility: HOSPITAL | Age: 53
Discharge: HOME | End: 2023-12-08
Payer: MEDICAID

## 2023-12-08 DIAGNOSIS — R06.02 SHORTNESS OF BREATH: ICD-10-CM

## 2023-12-08 PROCEDURE — 71046 X-RAY EXAM CHEST 2 VIEWS: CPT

## 2023-12-08 NOTE — TELEPHONE ENCOUNTER
I called and left a voicemail for patient to call back to office.    Last visit: 2/9/23 with Dr. Jessica--Ordered CTA Coronaries  Next visit: 1/2/24 with Dr. Jessica    2/26/23 CTA Chest:   IMPRESSION:  1. Mild dependent bilateral lower lobe atelectasis/edema. No  consolidations, sizeable effusions or pneumothorax. Specifically, no  evidence for acute pulmonary embolic disease, aortic aneurysm or  dissection is seen.  
I called and spoke with patient and went over plan for testing and appointment 1/2/24. He verbalized understanding. I let him know he can walk in to radiology department to have chxray done. Orders placed. I let Alea know echo needs scheduled. Patient denies questions.  
Patient called back and noticed that he is getting winded  with exertion. He was carrying a TV up 1 flight of steps and was winded. He called a couple weeks ago because noticed that he took 5 flights of stairs pretty fast and was out of breath so he continued to monitor. He doesn't notice shortness of breath with walking or normal activity, but thought he should mention this. He denies chest pain/chest pressure or heaviness. He does admit he has gained some weight. I let him know I would update Dr. Jessica to see if any testing is needed.  
Patient left a voicemail that he has been having some issues with shortness of breath. Requesting call back to discuss.  
What Type Of Note Output Would You Prefer (Optional)?: Bullet Format
How Severe Is Your Skin Lesion?: mild
Has Your Skin Lesion Been Treated?: not been treated
Is This A New Presentation, Or A Follow-Up?: Skin Lesions
Additional History: Small white dots

## 2023-12-11 ENCOUNTER — TELEPHONE (OUTPATIENT)
Dept: CARDIOLOGY | Facility: CLINIC | Age: 53
End: 2023-12-11
Payer: MEDICAID

## 2023-12-11 NOTE — TELEPHONE ENCOUNTER
----- Message from Bryson Jessica MD sent at 12/11/2023  7:23 AM EST -----  Not sure why we ordered the CXR but looks ok

## 2023-12-15 ENCOUNTER — TELEPHONE (OUTPATIENT)
Dept: CARDIOLOGY | Facility: CLINIC | Age: 53
End: 2023-12-15

## 2023-12-15 ENCOUNTER — HOSPITAL ENCOUNTER (OUTPATIENT)
Dept: CARDIOLOGY | Facility: CLINIC | Age: 53
Discharge: HOME | End: 2023-12-15
Payer: MEDICAID

## 2023-12-15 DIAGNOSIS — R06.02 SHORTNESS OF BREATH: ICD-10-CM

## 2023-12-15 LAB
AORTIC VALVE PEAK VELOCITY: 0.9
AV PEAK GRADIENT: 3.2
AVA (PEAK VEL): 3.54
EJECTION FRACTION APICAL 4 CHAMBER: 53
EJECTION FRACTION: 56
LEFT ATRIUM VOLUME AREA LENGTH INDEX BSA: 26.6
LEFT VENTRICLE INTERNAL DIMENSION DIASTOLE: 4.94 (ref 3.5–6)
LEFT VENTRICULAR OUTFLOW TRACT DIAMETER: 2.28
MITRAL VALVE E/A RATIO: 0.91
MITRAL VALVE E/E' RATIO: 5.14
RIGHT VENTRICLE FREE WALL PEAK S': 13
RIGHT VENTRICLE PEAK SYSTOLIC PRESSURE: 13.6
TRICUSPID ANNULAR PLANE SYSTOLIC EXCURSION: 2.3

## 2023-12-15 PROCEDURE — 93306 TTE W/DOPPLER COMPLETE: CPT

## 2023-12-15 PROCEDURE — 93306 TTE W/DOPPLER COMPLETE: CPT | Performed by: INTERNAL MEDICINE

## 2023-12-15 NOTE — TELEPHONE ENCOUNTER
Patient left a voicemail that he had an echo today, and has a question about it. Requesting call back.

## 2023-12-17 DIAGNOSIS — K21.9 GASTROESOPHAGEAL REFLUX DISEASE, UNSPECIFIED WHETHER ESOPHAGITIS PRESENT: Primary | ICD-10-CM

## 2023-12-18 NOTE — TELEPHONE ENCOUNTER
I called and spoke with patient and went over echo result. Patient has appt 1/2/24 with Dr. Jessica

## 2023-12-19 RX ORDER — OMEPRAZOLE 40 MG/1
40 CAPSULE, DELAYED RELEASE ORAL DAILY
Qty: 90 CAPSULE | Refills: 0 | Status: SHIPPED | OUTPATIENT
Start: 2023-12-19

## 2023-12-22 ENCOUNTER — APPOINTMENT (OUTPATIENT)
Dept: CARDIOLOGY | Facility: HOSPITAL | Age: 53
End: 2023-12-22
Payer: MEDICAID

## 2023-12-30 ENCOUNTER — HOSPITAL ENCOUNTER (EMERGENCY)
Facility: HOSPITAL | Age: 53
Discharge: HOME | End: 2023-12-30
Payer: MEDICAID

## 2023-12-30 ENCOUNTER — APPOINTMENT (OUTPATIENT)
Dept: RADIOLOGY | Facility: HOSPITAL | Age: 53
End: 2023-12-30
Payer: MEDICAID

## 2023-12-30 VITALS
SYSTOLIC BLOOD PRESSURE: 142 MMHG | DIASTOLIC BLOOD PRESSURE: 89 MMHG | OXYGEN SATURATION: 96 % | TEMPERATURE: 99.1 F | WEIGHT: 225 LBS | RESPIRATION RATE: 16 BRPM | BODY MASS INDEX: 32.21 KG/M2 | HEART RATE: 85 BPM | HEIGHT: 70 IN

## 2023-12-30 DIAGNOSIS — S63.501A WRIST SPRAIN, RIGHT, INITIAL ENCOUNTER: Primary | ICD-10-CM

## 2023-12-30 PROCEDURE — 73110 X-RAY EXAM OF WRIST: CPT | Mod: RT

## 2023-12-30 PROCEDURE — 99283 EMERGENCY DEPT VISIT LOW MDM: CPT

## 2023-12-30 PROCEDURE — 73110 X-RAY EXAM OF WRIST: CPT | Mod: RIGHT SIDE | Performed by: STUDENT IN AN ORGANIZED HEALTH CARE EDUCATION/TRAINING PROGRAM

## 2023-12-30 RX ORDER — NAPROXEN 500 MG/1
500 TABLET ORAL
Qty: 14 TABLET | Refills: 0 | Status: SHIPPED | OUTPATIENT
Start: 2023-12-30 | End: 2024-01-06

## 2023-12-30 ASSESSMENT — COLUMBIA-SUICIDE SEVERITY RATING SCALE - C-SSRS
6. HAVE YOU EVER DONE ANYTHING, STARTED TO DO ANYTHING, OR PREPARED TO DO ANYTHING TO END YOUR LIFE?: NO
2. HAVE YOU ACTUALLY HAD ANY THOUGHTS OF KILLING YOURSELF?: NO
1. IN THE PAST MONTH, HAVE YOU WISHED YOU WERE DEAD OR WISHED YOU COULD GO TO SLEEP AND NOT WAKE UP?: NO

## 2023-12-30 ASSESSMENT — PAIN DESCRIPTION - ORIENTATION: ORIENTATION: RIGHT

## 2023-12-30 ASSESSMENT — LIFESTYLE VARIABLES
REASON UNABLE TO ASSESS: YES
HAVE YOU EVER FELT YOU SHOULD CUT DOWN ON YOUR DRINKING: NO
EVER FELT BAD OR GUILTY ABOUT YOUR DRINKING: NO
HAVE PEOPLE ANNOYED YOU BY CRITICIZING YOUR DRINKING: NO
EVER HAD A DRINK FIRST THING IN THE MORNING TO STEADY YOUR NERVES TO GET RID OF A HANGOVER: NO

## 2023-12-30 ASSESSMENT — PAIN - FUNCTIONAL ASSESSMENT: PAIN_FUNCTIONAL_ASSESSMENT: 0-10

## 2023-12-30 ASSESSMENT — VISUAL ACUITY: OU: 1

## 2023-12-30 ASSESSMENT — PAIN DESCRIPTION - LOCATION: LOCATION: WRIST

## 2023-12-30 ASSESSMENT — PAIN SCALES - GENERAL: PAINLEVEL_OUTOF10: 6

## 2023-12-30 ASSESSMENT — PAIN DESCRIPTION - PAIN TYPE: TYPE: ACUTE PAIN

## 2023-12-30 NOTE — PROGRESS NOTES
"Counseling:  The patient was counseled regarding diagnostic results, instructions for management, risk factor reductions, prognosis, patient and family education, impressions, risks and benefits of treatment options and importance of compliance with treatment.      Chief Complaint:   The patient presents today for annual followup of angina and a-fib, and for evaluation of SOB s/p CXR and echocardiogram.      History Of Present Illness:    Julian Juarez is a 53 year old male patient who presents today for annual followup of angina, and for evaluation of SOB s/p CXR and echocardiogram. His PMH is significant for anxiety, GERD, Hashimoto's thyroiditis, mild SHERMAN, palpitations, and paroxysmal a-fib. The patient initially contacted our office on 11/20/2023 to report SOB after quickly ascending 5 flights of stairs, which he normally does not do. He was advised to monitor his symptoms and to call back with persistent or worsening symptoms. On 12/06/2023, the patient called our office to report persistent exertional SOB, for which a CXR and echocardiogram were ordered. CXR performed 12/08/2023 was unremarkable. On 12/15/2023, echocardiogram demonstrated an EF of 55%. Today, the patient states that he is feeling well. He reports SOB with exertion which he now realizes is likely s/t deconditioning. He denies any CP or chest discomfort outside of symptoms related to GERD.      Last Recorded Vitals:  Vitals:    01/02/24 1140   BP: 120/86   BP Location: Left arm   Pulse: 79   Weight: 102 kg (225 lb)   Height: 1.778 m (5' 10\")       Past Surgical History:  He has a past surgical history that includes CT angio coronary art with heartflow if score >30% (1/31/2022) and CT angio coronary art with heartflow if score >30% (2/24/2023).      Social History:  He reports that he has never smoked. He has never used smokeless tobacco. He reports that he does not currently use alcohol. He reports that he does not use drugs.    Family " History:  Family History   Problem Relation Name Age of Onset    Diabetes Mother      Other (bilateral renal calculus) Mother      Sleep apnea Brother      Diabetes Paternal Grandmother      Other (bilateral renal calculus) Paternal Grandmother          Allergies:  Patient has no known allergies.    Outpatient Medications:  Current Outpatient Medications   Medication Instructions    ergocalciferol (VITAMIN D-2) 50,000 Units, oral, Weekly    fluticasone (Flonase) 50 mcg/actuation nasal spray     levothyroxine (SYNTHROID, LEVOXYL) 112 mcg, oral, Daily    naproxen (NAPROSYN) 500 mg, oral, 2 times daily with meals    omeprazole (PRILOSEC) 40 mg, oral, Daily     Review of Systems   Respiratory:  Positive for shortness of breath.    All other systems reviewed and are negative.     Physical Exam:  Constitutional:       Appearance: Healthy appearance. Not in distress.   Neck:      Vascular: No JVR. JVD normal.   Pulmonary:      Effort: Pulmonary effort is normal.      Breath sounds: Normal breath sounds. No wheezing. No rhonchi. No rales.   Chest:      Chest wall: Not tender to palpatation.   Cardiovascular:      PMI at left midclavicular line. Normal rate. Regular rhythm. Normal S1. Normal S2.       Murmurs: There is no murmur.      No gallop.  No click. No rub.   Pulses:     Intact distal pulses.   Edema:     Peripheral edema absent.   Abdominal:      General: Bowel sounds are normal.      Palpations: Abdomen is soft.      Tenderness: There is no abdominal tenderness.   Musculoskeletal: Normal range of motion.         General: No tenderness. Skin:     General: Skin is warm and dry.   Neurological:      General: No focal deficit present.      Mental Status: Alert and oriented to person, place and time.          Last Labs:  CBC -  Lab Results   Component Value Date    WBC 4.5 09/27/2023    HGB 15.0 09/27/2023    HCT 45.1 09/27/2023    MCV 90 09/27/2023     09/27/2023       CMP -  Lab Results   Component Value Date     CALCIUM 8.8 09/27/2023    PROT 6.4 09/27/2023    ALBUMIN 4.2 09/27/2023    AST 17 09/27/2023    ALT 19 09/27/2023    ALKPHOS 59 09/27/2023    BILITOT 0.8 09/27/2023       LIPID PANEL -   Lab Results   Component Value Date    CHOL 199 09/27/2023    TRIG 101 09/27/2023    HDL 66.1 09/27/2023    CHHDL 3.0 09/27/2023    LDLF 113 (H) 09/27/2023    VLDL 20 09/27/2023       RENAL FUNCTION PANEL -   Lab Results   Component Value Date    GLUCOSE 98 09/27/2023     09/27/2023    K 4.0 09/27/2023     09/27/2023    CO2 26 09/27/2023    ANIONGAP 12 09/27/2023    BUN 14 09/27/2023    CREATININE 0.92 09/27/2023    GFRMALE >90 09/27/2023    CALCIUM 8.8 09/27/2023    ALBUMIN 4.2 09/27/2023        Lab Results   Component Value Date    BNP 14 02/26/2023    HGBA1C 5.7 05/31/2023       Last Cardiology Tests:  12/15/2023 - TTE  1. Left ventricular systolic function is normal with a 55% estimated ejection fraction.  2. Spectral Doppler shows an impaired relaxation pattern of left ventricular diastolic filling.    10/26/2023 to 11/09/2023 - Holter Monitor  1. Predominant underlying rhythm was sinus rhythm; min HR 44 bpm, max  bpm, avg HR 81 bpm.  2. 1 run of supraventricular tachycardia occurred; 4 beats, max rate 193 bpm.    02/23/2023 - CTA Coronary Arteries  Mild luminal irregularities in the coronary arteries as detailed above without significant luminal stenosis.    05/25/2022 to 06/10/2022 - Event Monitoring  1. Baseline rhythm was sinus with average HR 82 bpm.  2. Rare PACs/PVCs.  3. One atrial run consisting of 5 beats.  4. There was one episode of narrow complex rhythm  bpm with slight rate variation - favored to be sinus tachycardia, however supraventricular tachycardia not entirely excluded. Episode was asymptomatic.  5. Patient symptoms correlated with mostly sinus rhythm, rarely sinus bradycardia HR 54, or sinus tachycardia HR up to 123 bpm.     03/29/2022 to 03/30/2022 - Event Monitoring  Primary rhythm  "was sinus rhythm; avg HR 91 bpm, min HR 72 bpm, max  bpm.     02/02/2022 - TTE  The left ventricular systolic function is normal with a 55% estimated ejection fraction.     01/31/2022 - CTA Coronary Arteries  Normal coronary anatomy without evidence of atherosclerotic changes or stenotic disease.     10/07/2021 to 11/27/2021 - Event Monitoring  1. Baseline transmission showing sinus rhythm with a heart rate of 75 bpm.   2. PAC burden of less than 1%.   3. PVC burden of 1%.  4. 39 patient triggered events with reported skipped heartbeat, lightheadedness, or \"other\" correlating with sinus rhythm, sinus rhythm with PACs/PVCs, sinus tachycardia, sinus tachycardia with PACs/PVCs, or PSVT.      06/03/2021 - CT Cardiac Scoring  1. Total 2.  2. The visualized ascending thoracic aorta measures 3.4 cm in diameter.  3. The heart is normal in size. No pericardial effusion is present.  4. No gross evidence of mediastinal or hilar lymphadenopathy or masses is identified. The visualized segments of the lungs are normally expanded.  5. The main pulmonary artery, right and left pulmonary artery are normal in size.  6. The visualized subdiaphragmatic structures appear intact.     09/16/2020 to 10/15/2020 - Event Monitoring  1. 53 events were transmitted; 33 symptomatic, 20 device triggered.  2. SVT occurred 1 time with fastest run 138 bpm.  3. PAC burden of <1%.  4. PVC burden of 1%.     Lab review: I have personally reviewed the laboratory result(s)     Assessment/Plan   1) Chest Pain Suggestive of Angina  Stress echo in past negative   CTA coronary arteries 02/23/2023 with mild luminal irregularities without significant luminal stenosis  TTE 12/15/2023 with LVEF 55%  F/U 1 year    2) Exertional SOB  Patient contacted our office 11/20/2023 to report SOB after quickly ascending 5 flights of stairs - advised to monitor symptoms and to call back with persistent or worsening symptoms  Patient contacted our office 12/06/2023 to " report persistent exertional SOB - CXR and TTE ordered  CXR 12/08/2023 unremarkable  TTE 12/15/2023 with LVEF 55%   Reports SOB with exertion  - likely s/t deconditioning  Denies CP or discomfort outside of symptoms related to GERD  Advised to increase physical activity and follow a Mediterranean style diet to promote weight loss  F/U 1 year      3) Atrial Fibrillation   Patient contacted Dr. Miller's office 10/24/2023 to report ?a-fib at night during sleep - Holter ordered  Holter with 1 run of SVT, PACs, somewhat elevated HR (sinus tachycardia), no AF detected   EKG 11/10/2023 NSR  Seen by Dr. Miller 11/15/2023 - occasional episodes of palpitations at night, scheduled to receive new CPAP equipment.  F/U 1 year      Scribe Attestation  By signing my name below, I, Radha Ley Scrtanna   attest that this documentation has been prepared under the direction and in the presence of Bryson Jessica MD.

## 2023-12-31 NOTE — ED PROVIDER NOTES
HPI   Chief Complaint   Patient presents with    Wrist Pain     Right wrist       Patient presents to the emergency department for evaluation of wrist pain that began yesterday.  Patient is right-hand dominant was using his right wrist to beat on a metal rotor continuously for a prolonged period of time.  Since then he has noticed pain on the radial aspect of the wrist as well as swelling.  He took a dose of ibuprofen with persistence of his discomfort causing him to seek evaluation today.  He denies a history of fracture or problems to this area in the past.  He has not noticed any redness, ecchymosis or open wound since his discomfort.  He denies any limited range of motion of his hand or fingers or his elbow secondary to this.  His symptoms are aggravated by palpation and movement.  His symptoms are mild to moderate in severity and persistent nature.      History provided by:  Patient   used: No                          Jessica Coma Scale Score: 15                  Patient History   Past Medical History:   Diagnosis Date    Dizziness and giddiness 07/21/2022    Lightheadedness    Personal history of other diseases of the circulatory system     History of atrial fibrillation    Personal history of other endocrine, nutritional and metabolic disease     History of Hashimoto thyroiditis     Past Surgical History:   Procedure Laterality Date    CT ANGIO CORONARY ART WITH HEARTFLOW IF SCORE >30%  1/31/2022    CT HEART CORONARY ANGIOGRAM 1/31/2022 [facility]    CT ANGIO CORONARY ART WITH HEARTFLOW IF SCORE >30%  2/24/2023    CT HEART CORONARY ANGIOGRAM [facility] CT     Family History   Problem Relation Name Age of Onset    Diabetes Mother      Other (bilateral renal calculus) Mother      Sleep apnea Brother      Diabetes Paternal Grandmother      Other (bilateral renal calculus) Paternal Grandmother       Social History     Tobacco Use    Smoking status: Never    Smokeless tobacco: Never  "  Substance Use Topics    Alcohol use: Not Currently    Drug use: Never       Physical Exam   Visit Vitals  /89   Pulse 85   Temp 37.3 °C (99.1 °F) (Temporal)   Resp 16   Ht 1.778 m (5' 10\")   Wt 102 kg (225 lb)   SpO2 96%   BMI 32.28 kg/m²   Smoking Status Never   BSA 2.24 m²      Physical Exam  Vitals reviewed.   Constitutional:       Appearance: Normal appearance.   HENT:      Head: Normocephalic and atraumatic.      Right Ear: Hearing normal.      Left Ear: Hearing normal.      Nose: Nose normal.      Mouth/Throat:      Lips: Pink.      Mouth: Mucous membranes are moist.   Eyes:      General: Vision grossly intact.   Cardiovascular:      Rate and Rhythm: Normal rate and regular rhythm.   Pulmonary:      Effort: Pulmonary effort is normal.      Breath sounds: Normal breath sounds.   Musculoskeletal:      Cervical back: Normal range of motion and neck supple.      Comments: Strong right radial and ulnar pulses.  Normal capillary refill.  There is no bony tenderness to the right elbow and there is full flexion and extension.  Patient has no pain with supination and pronation.  There is tenderness at the distal radius with mild swelling and no crepitus, erythema, or ecchymosis.  No open wound.  He has no tenderness at the anatomic snuffbox nor tenderness on the ulnar aspect.  No tenderness through palpation of the hand or fingers.  Full sensation and motor movement intact along the radial, median and ulnar nerve distribution. Neurovascularly intact with compartments soft.     Skin:     General: Skin is warm and dry.      Capillary Refill: Capillary refill takes less than 2 seconds.      Findings: No rash.   Neurological:      Mental Status: He is alert and oriented to person, place, and time.      Sensory: Sensation is intact.      Motor: Motor function is intact.      Coordination: Coordination is intact.      Gait: Gait is intact.   Psychiatric:         Behavior: Behavior is cooperative.         XR wrist " right 3+ views   Final Result   No evidence of fracture or malalignment in the right wrist.             MACRO:   None        Signed by: Jenny Wesley 12/30/2023 9:39 PM   Dictation workstation:   [id number]          Labs Reviewed - No data to display      ED Course & MDM   Diagnoses as of 12/30/23 2211   Wrist sprain, right, initial encounter           Medical Decision Making  Patient presents to the ED for evaluation of right wrist pain. Differential diagnosis of fracture, sprain and contusion to mention a few. Plan is for x-ray and patient declines need for pain medication while in ED.    Imaging as interpreted by radiologist negative for acute findings as documented above. Plan is subsequently for symptom control with Naprosyn 500 mg BID with meals, ice, velcro wrist splint and with appropriate outpatient follow-up with orthopedics should symptoms persist after 10 to 14 days of conservative measures as provided on their discharge handout. Patient is educated on S/S to watch for indicative of re-evaluation in the ER setting including worsening of current symptoms not responding to the treatment plan. Patient verbalized understanding of instructions and is amenable to this treatment plan. Patient departed in stable condition with no social determinants of health that would obscure this outpatient management plan.             Your medication list        START taking these medications        Instructions Last Dose Given Next Dose Due   naproxen 500 mg tablet  Commonly known as: Naprosyn      Take 1 tablet (500 mg) by mouth 2 times a day with meals for 7 days.              ASK your doctor about these medications        Instructions Last Dose Given Next Dose Due   ergocalciferol 1.25 MG (71737 UT) capsule  Commonly known as: Vitamin D-2      Take 1 capsule (50,000 Units) by mouth 1 (one) time per week.       levothyroxine 112 mcg tablet  Commonly known as: Synthroid, Levoxyl      Take 1 tablet (112 mcg) by  mouth once daily.       omeprazole 40 mg DR capsule  Commonly known as: PriLOSEC      Take 1 capsule by mouth once daily                 Where to Get Your Medications        These medications were sent to [id number] - [place], [place] - [address]  [address] [address]      Phone: [phone]   naproxen 500 mg tablet         Procedure  Procedures     *This report was transcribed using voice recognition software.  Every effort was made to ensure accuracy; however, inadvertent computerized transcription errors may be present.*  LINDA Pappas-LAZARO  12/30/23         Arleen Carolina, LINDA-LAZARO  12/30/23 2210

## 2023-12-31 NOTE — DISCHARGE INSTRUCTIONS
WRIST SPRAIN PATIENT INSTRUCTIONS:    What are common types of wrist injuries? -- Common types of wrist injuries include:  ?Sprains - A wrist sprain is when a ligament in the wrist tears or gets stretched too much. Ligaments are tough bands of tissue that connect bones to other bones.  ?Fractures - A “fracture” is another word for a broken bone. There are different kinds of wrist fractures. Some wrist fractures involve a break in one of the small bones in the wrist. Others involve a break in one of the forearm bones near the wrist. One common type of wrist fracture is called a “Colles’ fracture.” This fracture involves the end of one of the forearm bones. It can happen when a person falls onto his or her outstretched hand.    What are the symptoms of a wrist injury? -- Wrist injuries can cause:  ?Pain  ?Swelling  ?Bruising  ?Stiffness  ?Weakness in the wrist or arm  ?The arm or wrist to look bent    Sprains usually cause less pain and swelling than fractures, but not always.    Will I need tests? -- Probably. Your doctor or nurse will ask about your symptoms and do an exam. To check for a fracture, he or she will do an X-ray. He or she might also do an imaging test such as a CT or MRI scan. Imaging tests create pictures of the inside of the body.    How are wrist injuries treated? -- Treatment depends on the type of injury you have and how severe it is.    If you have a lot of pain or a severe injury, your doctor will prescribe a strong pain medicine. If your injury is mild, your doctor might recommend that you take an over-the-counter medicine for your pain. Over-the-counter medicines include acetaminophen (sample brand name: Tylenol), ibuprofen (sample brand names: Advil, Motrin), and naproxen (sample brand name: Aleve).    Treatment for a wrist sprain can include:  ?Resting your wrist  ?Putting ice on your wrist - To reduce swelling, you can put a cold gel pack, bag of ice, or bag of frozen vegetables on the  injured area every 1 to 2 hours, for 15 minutes each time. You should put a thin towel between the ice (or other cold object) and your skin. You should use the ice (or other cold object) for at least 6 hours after your injury. Some people find it helpful to ice longer, even up to 2 days after their injury.  ?Wearing an elastic bandage (such as an ACE wrap) or splint  ?Surgery - Most people with a wrist sprain do not need surgery. But people who tore a ligament might need surgery to fix the ligament.    A wrist fracture is usually treated with a splint or cast. But your doctor might wait for your swelling to get better before he or she puts the cast on your wrist. Swelling usually gets better in a few days. While you wait for the swelling to get better, your doctor will probably have you wear a splint.    Before your doctor puts the cast on your wrist, he or she will make sure that your bone is in the correct position. If your bone is not in the correct position, your doctor might do a procedure or surgery to put your bone in the correct position.     After your cast or splint comes off, your doctor might recommend that you see a physical therapist (exercise expert). The physical therapist can show you exercises and stretches to make your muscles stronger and help your joints move more easily.  How long do wrist injuries take to heal? -- It depends partly on the type of injury and how severe it is. A mild sprain takes about 1 to 2 weeks to heal. Fractures can take weeks to months to heal.     It also depends on the person. Healthy children usually heal very quickly. Older adults or adults with other medical problems can take much longer to heal.    Can I do anything to improve the healing process? -- Yes. It’s important to follow all of your doctor’s instructions while your sprain or fracture is healing. He or she will probably recommend that you eat a healthy diet that includes getting enough calcium, vitamin D, and  protein. He or she will also probably recommend that you avoid doing certain things.     For example, he or she might recommend that you:  ?Avoid doing certain physical activities.  ?Avoid smoking - A fracture can take longer to heal if you smoke.  ?Avoid damaging your cast or getting it wet, if you have a cast that shouldn’t get wet.  When should I call my doctor or nurse? -- Your doctor or nurse will tell you when to call him or her. In general, you should call your doctor or nurse if:  ?You have severe pain, or your pain or swelling gets worse.  ?You have numbness or tingling in your fingers, or your fingers look blue or purple.  ?You damage your cast or get it wet, and it’s not supposed to get wet.    INFORMATION FROM UP TO DATE - ALL RIGHTS RESERVED

## 2023-12-31 NOTE — ED TRIAGE NOTES
Pt presents with right wrist pain and swelling after hammering the rotors off of a vehicle. States worsening pain with movement

## 2024-01-02 ENCOUNTER — OFFICE VISIT (OUTPATIENT)
Dept: CARDIOLOGY | Facility: CLINIC | Age: 54
End: 2024-01-02
Payer: MEDICAID

## 2024-01-02 VITALS
BODY MASS INDEX: 32.21 KG/M2 | HEIGHT: 70 IN | DIASTOLIC BLOOD PRESSURE: 86 MMHG | HEART RATE: 79 BPM | SYSTOLIC BLOOD PRESSURE: 120 MMHG | WEIGHT: 225 LBS

## 2024-01-02 DIAGNOSIS — I48.0 PAROXYSMAL ATRIAL FIBRILLATION (MULTI): Primary | ICD-10-CM

## 2024-01-02 PROCEDURE — 3008F BODY MASS INDEX DOCD: CPT | Performed by: INTERNAL MEDICINE

## 2024-01-02 PROCEDURE — 1036F TOBACCO NON-USER: CPT | Performed by: INTERNAL MEDICINE

## 2024-01-02 PROCEDURE — 93000 ELECTROCARDIOGRAM COMPLETE: CPT | Performed by: INTERNAL MEDICINE

## 2024-01-02 PROCEDURE — 99213 OFFICE O/P EST LOW 20 MIN: CPT | Performed by: INTERNAL MEDICINE

## 2024-01-02 RX ORDER — FLUTICASONE PROPIONATE 50 MCG
SPRAY, SUSPENSION (ML) NASAL
COMMUNITY
Start: 2023-12-28

## 2024-01-02 ASSESSMENT — ENCOUNTER SYMPTOMS
DEPRESSION: 0
OCCASIONAL FEELINGS OF UNSTEADINESS: 0
LOSS OF SENSATION IN FEET: 0
SHORTNESS OF BREATH: 1

## 2024-01-02 NOTE — LETTER
January 2, 2024     Guevara Varghese DO  [address] [facility] [address]    Patient: Julian Juarez   YOB: 1970   Date of Visit: 1/2/2024       Dear Dr. Gueavra Varghese DO:    Thank you for referring Julian Juarez to me for evaluation. Below are my notes for this consultation.  If you have questions, please do not hesitate to call me. I look forward to following your patient along with you.       Sincerely,     Bryson Jessica MD      CC: No Recipients  ______________________________________________________________________________________    Counseling:  The patient was counseled regarding diagnostic results, instructions for management, risk factor reductions, prognosis, patient and family education, impressions, risks and benefits of treatment options and importance of compliance with treatment.      Chief Complaint:   The patient presents today for annual followup of angina and a-fib, and for evaluation of SOB s/p CXR and echocardiogram.      History Of Present Illness:    Julian Juarez is a 53 year old male patient who presents today for annual followup of angina, and for evaluation of SOB s/p CXR and echocardiogram. His PMH is significant for anxiety, GERD, Hashimoto's thyroiditis, mild SHERMAN, palpitations, and paroxysmal a-fib. The patient initially contacted our office on 11/20/2023 to report SOB after quickly ascending 5 flights of stairs, which he normally does not do. He was advised to monitor his symptoms and to call back with persistent or worsening symptoms. On 12/06/2023, the patient called our office to report persistent exertional SOB, for which a CXR and echocardiogram were ordered. CXR performed 12/08/2023 was unremarkable. On 12/15/2023, echocardiogram demonstrated an EF of 55%. Today, the patient states that he is feeling well. He reports SOB with exertion which he now realizes is likely s/t deconditioning. He denies any CP or chest discomfort outside of symptoms related to GERD.   "    Last Recorded Vitals:  Vitals:    01/02/24 1140   BP: 120/86   BP Location: Left arm   Pulse: 79   Weight: 102 kg (225 lb)   Height: 1.778 m (5' 10\")       Past Surgical History:  He has a past surgical history that includes CT angio coronary art with heartflow if score >30% (1/31/2022) and CT angio coronary art with heartflow if score >30% (2/24/2023).      Social History:  He reports that he has never smoked. He has never used smokeless tobacco. He reports that he does not currently use alcohol. He reports that he does not use drugs.    Family History:  Family History   Problem Relation Name Age of Onset   • Diabetes Mother     • Other (bilateral renal calculus) Mother     • Sleep apnea Brother     • Diabetes Paternal Grandmother     • Other (bilateral renal calculus) Paternal Grandmother          Allergies:  Patient has no known allergies.    Outpatient Medications:  Current Outpatient Medications   Medication Instructions   • ergocalciferol (VITAMIN D-2) 50,000 Units, oral, Weekly   • fluticasone (Flonase) 50 mcg/actuation nasal spray    • levothyroxine (SYNTHROID, LEVOXYL) 112 mcg, oral, Daily   • naproxen (NAPROSYN) 500 mg, oral, 2 times daily with meals   • omeprazole (PRILOSEC) 40 mg, oral, Daily     Review of Systems   Respiratory:  Positive for shortness of breath.    All other systems reviewed and are negative.     Physical Exam:  Constitutional:       Appearance: Healthy appearance. Not in distress.   Neck:      Vascular: No JVR. JVD normal.   Pulmonary:      Effort: Pulmonary effort is normal.      Breath sounds: Normal breath sounds. No wheezing. No rhonchi. No rales.   Chest:      Chest wall: Not tender to palpatation.   Cardiovascular:      PMI at left midclavicular line. Normal rate. Regular rhythm. Normal S1. Normal S2.       Murmurs: There is no murmur.      No gallop.  No click. No rub.   Pulses:     Intact distal pulses.   Edema:     Peripheral edema absent.   Abdominal:      General: Bowel " sounds are normal.      Palpations: Abdomen is soft.      Tenderness: There is no abdominal tenderness.   Musculoskeletal: Normal range of motion.         General: No tenderness. Skin:     General: Skin is warm and dry.   Neurological:      General: No focal deficit present.      Mental Status: Alert and oriented to person, place and time.          Last Labs:  CBC -  Lab Results   Component Value Date    WBC 4.5 09/27/2023    HGB 15.0 09/27/2023    HCT 45.1 09/27/2023    MCV 90 09/27/2023     09/27/2023       CMP -  Lab Results   Component Value Date    CALCIUM 8.8 09/27/2023    PROT 6.4 09/27/2023    ALBUMIN 4.2 09/27/2023    AST 17 09/27/2023    ALT 19 09/27/2023    ALKPHOS 59 09/27/2023    BILITOT 0.8 09/27/2023       LIPID PANEL -   Lab Results   Component Value Date    CHOL 199 09/27/2023    TRIG 101 09/27/2023    HDL 66.1 09/27/2023    CHHDL 3.0 09/27/2023    LDLF 113 (H) 09/27/2023    VLDL 20 09/27/2023       RENAL FUNCTION PANEL -   Lab Results   Component Value Date    GLUCOSE 98 09/27/2023     09/27/2023    K 4.0 09/27/2023     09/27/2023    CO2 26 09/27/2023    ANIONGAP 12 09/27/2023    BUN 14 09/27/2023    CREATININE 0.92 09/27/2023    GFRMALE >90 09/27/2023    CALCIUM 8.8 09/27/2023    ALBUMIN 4.2 09/27/2023        Lab Results   Component Value Date    BNP 14 02/26/2023    HGBA1C 5.7 05/31/2023       Last Cardiology Tests:  12/15/2023 - TTE  1. Left ventricular systolic function is normal with a 55% estimated ejection fraction.  2. Spectral Doppler shows an impaired relaxation pattern of left ventricular diastolic filling.    10/26/2023 to 11/09/2023 - Holter Monitor  1. Predominant underlying rhythm was sinus rhythm; min HR 44 bpm, max  bpm, avg HR 81 bpm.  2. 1 run of supraventricular tachycardia occurred; 4 beats, max rate 193 bpm.    02/23/2023 - CTA Coronary Arteries  Mild luminal irregularities in the coronary arteries as detailed above without significant luminal  "stenosis.    05/25/2022 to 06/10/2022 - Event Monitoring  1. Baseline rhythm was sinus with average HR 82 bpm.  2. Rare PACs/PVCs.  3. One atrial run consisting of 5 beats.  4. There was one episode of narrow complex rhythm  bpm with slight rate variation - favored to be sinus tachycardia, however supraventricular tachycardia not entirely excluded. Episode was asymptomatic.  5. Patient symptoms correlated with mostly sinus rhythm, rarely sinus bradycardia HR 54, or sinus tachycardia HR up to 123 bpm.     03/29/2022 to 03/30/2022 - Event Monitoring  Primary rhythm was sinus rhythm; avg HR 91 bpm, min HR 72 bpm, max  bpm.     02/02/2022 - TTE  The left ventricular systolic function is normal with a 55% estimated ejection fraction.     01/31/2022 - CTA Coronary Arteries  Normal coronary anatomy without evidence of atherosclerotic changes or stenotic disease.     10/07/2021 to 11/27/2021 - Event Monitoring  1. Baseline transmission showing sinus rhythm with a heart rate of 75 bpm.   2. PAC burden of less than 1%.   3. PVC burden of 1%.  4. 39 patient triggered events with reported skipped heartbeat, lightheadedness, or \"other\" correlating with sinus rhythm, sinus rhythm with PACs/PVCs, sinus tachycardia, sinus tachycardia with PACs/PVCs, or PSVT.      06/03/2021 - CT Cardiac Scoring  1. Total 2.  2. The visualized ascending thoracic aorta measures 3.4 cm in diameter.  3. The heart is normal in size. No pericardial effusion is present.  4. No gross evidence of mediastinal or hilar lymphadenopathy or masses is identified. The visualized segments of the lungs are normally expanded.  5. The main pulmonary artery, right and left pulmonary artery are normal in size.  6. The visualized subdiaphragmatic structures appear intact.     09/16/2020 to 10/15/2020 - Event Monitoring  1. 53 events were transmitted; 33 symptomatic, 20 device triggered.  2. SVT occurred 1 time with fastest run 138 bpm.  3. PAC burden of " <1%.  4. PVC burden of 1%.     Lab review: I have personally reviewed the laboratory result(s)     Assessment/Plan  1) Chest Pain Suggestive of Angina  Stress echo in past negative   CTA coronary arteries 02/23/2023 with mild luminal irregularities without significant luminal stenosis  TTE 12/15/2023 with LVEF 55%  F/U 1 year    2) Exertional SOB  Patient contacted our office 11/20/2023 to report SOB after quickly ascending 5 flights of stairs - advised to monitor symptoms and to call back with persistent or worsening symptoms  Patient contacted our office 12/06/2023 to report persistent exertional SOB - CXR and TTE ordered  CXR 12/08/2023 unremarkable  TTE 12/15/2023 with LVEF 55%   Reports SOB with exertion  - likely s/t deconditioning  Denies CP or discomfort outside of symptoms related to GERD  Advised to increase physical activity and follow a Mediterranean style diet to promote weight loss  F/U 1 year      3) Atrial Fibrillation   Patient contacted Dr. Miller's office 10/24/2023 to report ?a-fib at night during sleep - Holter ordered  Holter with 1 run of SVT, PACs, somewhat elevated HR (sinus tachycardia), no AF detected   EKG 11/10/2023 NSR  Seen by Dr. Miller 11/15/2023 - occasional episodes of palpitations at night, scheduled to receive new CPAP equipment.  F/U 1 year      Scribe Attestation  By signing my name below, I, Radha Ley Scribe   attest that this documentation has been prepared under the direction and in the presence of Bryson Jessica MD.

## 2024-01-02 NOTE — PATIENT INSTRUCTIONS
Continue all current medications as prescribed.   Dr. Jessiac has recommended increasing your physical activity and monitoring your diet. Watch carbohydrate intake (rice, potatoes, pasta, bread, ice-cream all within moderation); increase greens, veggies, fiber, lean protein (fish, turkey, chicken; baked/boiled/grilled, not fried) and fruit.   Followup with Dr. Jessica in 1 year, sooner should any issues or concerns arise before then.     If you have any questions or cardiac concerns, please call our office at [phone].

## 2024-01-18 ENCOUNTER — APPOINTMENT (OUTPATIENT)
Dept: SLEEP MEDICINE | Facility: CLINIC | Age: 54
End: 2024-01-18
Payer: MEDICAID

## 2024-01-30 ENCOUNTER — TELEPHONE (OUTPATIENT)
Dept: PRIMARY CARE | Facility: CLINIC | Age: 54
End: 2024-01-30
Payer: MEDICAID

## 2024-01-30 DIAGNOSIS — E06.3 HASHIMOTO'S THYROIDITIS: Primary | ICD-10-CM

## 2024-02-01 ENCOUNTER — APPOINTMENT (OUTPATIENT)
Dept: DERMATOLOGY | Facility: CLINIC | Age: 54
End: 2024-02-01
Payer: MEDICAID

## 2024-02-15 ENCOUNTER — LAB (OUTPATIENT)
Dept: LAB | Facility: LAB | Age: 54
End: 2024-02-15
Payer: MEDICAID

## 2024-02-15 DIAGNOSIS — E06.3 HASHIMOTO'S THYROIDITIS: ICD-10-CM

## 2024-02-15 LAB
ALBUMIN SERPL BCP-MCNC: 4 G/DL (ref 3.4–5)
ALP SERPL-CCNC: 62 U/L (ref 33–120)
ALT SERPL W P-5'-P-CCNC: 20 U/L (ref 10–52)
ANION GAP SERPL CALC-SCNC: 11 MMOL/L (ref 10–20)
AST SERPL W P-5'-P-CCNC: 18 U/L (ref 9–39)
BASOPHILS # BLD AUTO: 0.07 X10*3/UL (ref 0–0.1)
BASOPHILS NFR BLD AUTO: 1.3 %
BILIRUB SERPL-MCNC: 0.3 MG/DL (ref 0–1.2)
BUN SERPL-MCNC: 13 MG/DL (ref 6–23)
CALCIUM SERPL-MCNC: 8.8 MG/DL (ref 8.6–10.3)
CHLORIDE SERPL-SCNC: 105 MMOL/L (ref 98–107)
CO2 SERPL-SCNC: 28 MMOL/L (ref 21–32)
CREAT SERPL-MCNC: 0.82 MG/DL (ref 0.5–1.3)
EGFRCR SERPLBLD CKD-EPI 2021: >90 ML/MIN/1.73M*2
EOSINOPHIL # BLD AUTO: 0.11 X10*3/UL (ref 0–0.7)
EOSINOPHIL NFR BLD AUTO: 2 %
ERYTHROCYTE [DISTWIDTH] IN BLOOD BY AUTOMATED COUNT: 12.5 % (ref 11.5–14.5)
GLUCOSE SERPL-MCNC: 109 MG/DL (ref 74–99)
HCT VFR BLD AUTO: 44.9 % (ref 41–52)
HGB BLD-MCNC: 15.2 G/DL (ref 13.5–17.5)
IMM GRANULOCYTES # BLD AUTO: 0.03 X10*3/UL (ref 0–0.7)
IMM GRANULOCYTES NFR BLD AUTO: 0.5 % (ref 0–0.9)
LYMPHOCYTES # BLD AUTO: 1.92 X10*3/UL (ref 1.2–4.8)
LYMPHOCYTES NFR BLD AUTO: 34.8 %
MCH RBC QN AUTO: 30.2 PG (ref 26–34)
MCHC RBC AUTO-ENTMCNC: 33.9 G/DL (ref 32–36)
MCV RBC AUTO: 89 FL (ref 80–100)
MONOCYTES # BLD AUTO: 0.46 X10*3/UL (ref 0.1–1)
MONOCYTES NFR BLD AUTO: 8.3 %
NEUTROPHILS # BLD AUTO: 2.92 X10*3/UL (ref 1.2–7.7)
NEUTROPHILS NFR BLD AUTO: 53.1 %
NRBC BLD-RTO: 0 /100 WBCS (ref 0–0)
PLATELET # BLD AUTO: 361 X10*3/UL (ref 150–450)
POTASSIUM SERPL-SCNC: 3.9 MMOL/L (ref 3.5–5.3)
PROT SERPL-MCNC: 6.2 G/DL (ref 6.4–8.2)
RBC # BLD AUTO: 5.03 X10*6/UL (ref 4.5–5.9)
SODIUM SERPL-SCNC: 140 MMOL/L (ref 136–145)
T4 FREE SERPL-MCNC: 0.86 NG/DL (ref 0.61–1.12)
TSH SERPL-ACNC: 3.36 MIU/L (ref 0.44–3.98)
WBC # BLD AUTO: 5.5 X10*3/UL (ref 4.4–11.3)

## 2024-02-15 PROCEDURE — 85025 COMPLETE CBC W/AUTO DIFF WBC: CPT

## 2024-02-15 PROCEDURE — 84443 ASSAY THYROID STIM HORMONE: CPT

## 2024-02-15 PROCEDURE — 84481 FREE ASSAY (FT-3): CPT

## 2024-02-15 PROCEDURE — 84439 ASSAY OF FREE THYROXINE: CPT

## 2024-02-15 PROCEDURE — 80053 COMPREHEN METABOLIC PANEL: CPT

## 2024-02-15 PROCEDURE — 36415 COLL VENOUS BLD VENIPUNCTURE: CPT

## 2024-02-16 LAB — T3FREE SERPL-MCNC: 3.6 PG/ML (ref 2.3–4.2)

## 2024-03-05 ENCOUNTER — APPOINTMENT (OUTPATIENT)
Dept: SLEEP MEDICINE | Facility: CLINIC | Age: 54
End: 2024-03-05
Payer: MEDICAID

## 2024-03-21 ENCOUNTER — ANCILLARY PROCEDURE (OUTPATIENT)
Dept: CARDIOLOGY | Facility: CLINIC | Age: 54
End: 2024-03-21
Payer: MEDICAID

## 2024-03-21 DIAGNOSIS — I48.91 ATRIAL FIBRILLATION, UNSPECIFIED TYPE (MULTI): ICD-10-CM

## 2024-03-21 PROCEDURE — 93246 EXT ECG>7D<15D RECORDING: CPT | Performed by: INTERNAL MEDICINE

## 2024-03-21 PROCEDURE — 93248 EXT ECG>7D<15D REV&INTERPJ: CPT | Performed by: INTERNAL MEDICINE

## 2024-03-26 ENCOUNTER — HOSPITAL ENCOUNTER (OUTPATIENT)
Dept: RADIOLOGY | Facility: HOSPITAL | Age: 54
Discharge: HOME | End: 2024-03-26
Payer: MEDICAID

## 2024-03-26 ENCOUNTER — OFFICE VISIT (OUTPATIENT)
Dept: PRIMARY CARE | Facility: CLINIC | Age: 54
End: 2024-03-26
Payer: MEDICAID

## 2024-03-26 VITALS
WEIGHT: 224 LBS | BODY MASS INDEX: 32.07 KG/M2 | SYSTOLIC BLOOD PRESSURE: 104 MMHG | HEART RATE: 61 BPM | OXYGEN SATURATION: 99 % | TEMPERATURE: 97.9 F | HEIGHT: 70 IN | DIASTOLIC BLOOD PRESSURE: 78 MMHG

## 2024-03-26 DIAGNOSIS — E55.9 VITAMIN D DEFICIENCY: ICD-10-CM

## 2024-03-26 DIAGNOSIS — I48.0 PAROXYSMAL ATRIAL FIBRILLATION (MULTI): ICD-10-CM

## 2024-03-26 DIAGNOSIS — E66.9 OBESITY, CLASS I, BMI 30-34.9: ICD-10-CM

## 2024-03-26 DIAGNOSIS — R09.89 RHONCHI AT RIGHT LUNG BASE: ICD-10-CM

## 2024-03-26 DIAGNOSIS — J40 BRONCHITIS: ICD-10-CM

## 2024-03-26 DIAGNOSIS — E06.3 HASHIMOTO'S THYROIDITIS: Primary | ICD-10-CM

## 2024-03-26 DIAGNOSIS — N32.0 BLADDER NECK OBSTRUCTION: ICD-10-CM

## 2024-03-26 DIAGNOSIS — G47.33 OBSTRUCTIVE SLEEP APNEA: ICD-10-CM

## 2024-03-26 DIAGNOSIS — K21.9 GASTROESOPHAGEAL REFLUX DISEASE, UNSPECIFIED WHETHER ESOPHAGITIS PRESENT: ICD-10-CM

## 2024-03-26 DIAGNOSIS — E53.8 VITAMIN B12 DEFICIENCY: ICD-10-CM

## 2024-03-26 PROCEDURE — 71046 X-RAY EXAM CHEST 2 VIEWS: CPT | Performed by: STUDENT IN AN ORGANIZED HEALTH CARE EDUCATION/TRAINING PROGRAM

## 2024-03-26 PROCEDURE — 3008F BODY MASS INDEX DOCD: CPT | Performed by: FAMILY MEDICINE

## 2024-03-26 PROCEDURE — 71046 X-RAY EXAM CHEST 2 VIEWS: CPT

## 2024-03-26 PROCEDURE — 1036F TOBACCO NON-USER: CPT | Performed by: FAMILY MEDICINE

## 2024-03-26 PROCEDURE — 99214 OFFICE O/P EST MOD 30 MIN: CPT | Performed by: FAMILY MEDICINE

## 2024-03-26 ASSESSMENT — PATIENT HEALTH QUESTIONNAIRE - PHQ9
2. FEELING DOWN, DEPRESSED OR HOPELESS: NOT AT ALL
SUM OF ALL RESPONSES TO PHQ9 QUESTIONS 1 AND 2: 0
1. LITTLE INTEREST OR PLEASURE IN DOING THINGS: NOT AT ALL

## 2024-03-26 NOTE — PROGRESS NOTES
Subjective   Patient ID: Julian Juarez is a 54 y.o. male who presents for well exam  HPI  Reveiwed chronic illness and current respiratory symptoms  Review of Systems    Objective   Physical Exam  CN II general: Patient is alert and oriented ×3 and appears in no acute distress. No respiratory distress.    Head: Atraumatic normocephalic.    Eyes: EOMI, PERRLA      Ears: Canals patent without any irritation, tympanic membranes without inflammation, no swelling, normal light reflex.    Nose: Nares patent. Turbinates are not swollen. No discharge.    Mouth: Normal mucosa. Moist. No erythema, exudates, tonsillar enlargement.    Neck: Normal range of motion, no masses.  Thyroid is palpable and normal in size without any nodules. No anterior cervical or posterior cervical adenopathy.    Heart: Regular rate and rhythm, no murmurs clicks or gallops    Lungs: Clear to auscultation bilaterally without any rales or wheezing, Ronchi heard in the right middle and lower lung lobes, lung sounds heard throughout all lung fields    Abdomen: Soft, nontender, no rigidity, rebound, guarding or organomegaly. Bowel sounds ×4 quadrants.    Musculoskeletal: Normal range of motion, strength is grossly intact in the proximal distal muscles of the upper and lower extremities bilaterally, deep tendon reflexes +2 out of 4 and symmetric bilaterally at the patella, Achilles, biceps, triceps, sensation intact.    Nerves: Cranial nerves II through XII appear grossly intact and without deficit    Skin: Intact, dry, no rashes or erythema    Psych: Normal affect.   Assessment/Plan   Problem List Items Addressed This Visit       Gastroesophageal reflux disease    Hashimoto's thyroiditis - Primary    Relevant Orders    Thyroid Stimulating Hormone    Triiodothyronine, Free    Thyroxine, Free    Obstructive sleep apnea    Atrial fibrillation (CMS/HCC)    Relevant Orders    Comprehensive Metabolic Panel    Lipid Panel    Vitamin B12    Vitamin B12  deficiency    Relevant Orders    Vitamin B12    Vitamin D deficiency    Relevant Orders    Vitamin D 25-Hydroxy,Total (for eval of Vitamin D levels)    Obesity, Class I, BMI 30-34.9    Bladder neck obstruction     Other Visit Diagnoses       Bronchitis        Rhonchi at right lung base        Relevant Orders    XR chest 2 views    CBC and Auto Differential        Cardiology Dr. Jessica - PRAKASH-Fib  Urology Dr. Lujan- Yearly prostate exams and hematuria follow up.  Has couple small kidney stones.    GI: [place] - Functional dyspepsia.  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  Just following    Bronchitis  - Chest xray ordered since right sided ronchi  - Steroid and antibiotic given to take if worsening or chest xray shows infection.

## 2024-03-28 ENCOUNTER — TELEPHONE (OUTPATIENT)
Dept: PRIMARY CARE | Facility: CLINIC | Age: 54
End: 2024-03-28
Payer: MEDICAID

## 2024-03-29 ENCOUNTER — TELEPHONE (OUTPATIENT)
Dept: PRIMARY CARE | Facility: CLINIC | Age: 54
End: 2024-03-29
Payer: MEDICAID

## 2024-03-29 NOTE — TELEPHONE ENCOUNTER
----- Message from Guevara Varghese DO sent at 3/29/2024 10:23 AM EDT -----  Please let the patient know that his chest x-ray was normal

## 2024-04-03 ENCOUNTER — OFFICE VISIT (OUTPATIENT)
Dept: DERMATOLOGY | Facility: CLINIC | Age: 54
End: 2024-04-03
Payer: MEDICAID

## 2024-04-03 DIAGNOSIS — B07.8 OTHER VIRAL WARTS: ICD-10-CM

## 2024-04-03 DIAGNOSIS — L72.11 PILAR CYST: Primary | ICD-10-CM

## 2024-04-03 DIAGNOSIS — L30.9 DERMATITIS: ICD-10-CM

## 2024-04-03 DIAGNOSIS — R21 RASH AND OTHER NONSPECIFIC SKIN ERUPTION: ICD-10-CM

## 2024-04-03 PROCEDURE — 17110 DESTRUCTION B9 LES UP TO 14: CPT | Performed by: NURSE PRACTITIONER

## 2024-04-03 PROCEDURE — 3008F BODY MASS INDEX DOCD: CPT | Performed by: NURSE PRACTITIONER

## 2024-04-03 PROCEDURE — 99203 OFFICE O/P NEW LOW 30 MIN: CPT | Performed by: NURSE PRACTITIONER

## 2024-04-03 PROCEDURE — 1036F TOBACCO NON-USER: CPT | Performed by: NURSE PRACTITIONER

## 2024-04-03 RX ORDER — TRIAMCINOLONE ACETONIDE 1 MG/G
CREAM TOPICAL
Qty: 80 G | Refills: 3 | Status: SHIPPED | OUTPATIENT
Start: 2024-04-03

## 2024-04-03 NOTE — PROGRESS NOTES
Subjective     Julian Juarez is a 54 y.o. male who presents for the following: Suspicious Skin Lesion (Presents to office with complaints of wart to left hand, first digit. Additionally, trauma (years ago) to right posterior forearm, concerned with scar. Lastly, requesting LN2 to face SK(s). No further complaints. ).     Review of Systems:  No other skin or systemic complaints other than what is documented elsewhere in the note.    The following portions of the chart were reviewed this encounter and updated as appropriate:          Skin Cancer History  No skin cancer on file.      Specialty Problems          Dermatology Problems    Onychomycosis        Objective   Well appearing patient in no apparent distress; mood and affect are within normal limits.    A focused skin examination was performed. All findings within normal limits unless otherwise noted below.    Assessment/Plan   1. Pilar cyst  Head - Anterior (Face)  2.0cm semi-mobile nodule with normal overlying skin    Pilar cysts, sometimes referred to as trichilemmal cysts or wens, are common growths that form from hair follicles; they are most often found on the scalp. Pilar cysts are smooth and mobile, meaning they can be moved slightly under the skin. They are filled with keratin (a protein component found in hair, nails, and skin). They are usually painless but can be tender. There may be one or a few pilar cysts.   No treatment is necessary, unless the cyst becomes symptomatic.     2. Other viral warts  Right Hand - Anterior  Verrucous papule    -Discussed nature of condition  -Multiple treatments in office are often needed  -Diligent at home therapy is often needed  -Cryotherapy today  -Possible side effects of liquid nitrogen treatment reviewed including formation of blisters, crusting, tenderness, scar, and discoloration which may be permanent.  -Patient advised to return the office for re-evaluation if the treated lesion(s) do not resolve within 4-6  weeks. Patient verbalizes understanding.    Destr of lesion - Right Hand - Anterior  Complexity: simple    Destruction method: cryotherapy    Informed consent: discussed and consent obtained    Lesion destroyed using liquid nitrogen: Yes    Cryotherapy cycles:  2  Outcome: patient tolerated procedure well with no complications    Post-procedure details: wound care instructions given      3. Dermatitis  Left Breast  Scaly erythematous papules and plaques with edema and vesiculation in a distribution consistent with contact to adhesive tape    - Irritant contact dermatitis is an inflammatory rash caused by direct physical or chemical injury to the skin. Unlike allergic contact dermatitis, which appears 24-72 hours after exposure to an allergen, the symptoms of irritant contact dermatitis can result within a few hours, especially with exposure to a strong irritant.  - Most individuals notice a burning or stinging sensation shortly after exposure to the irritant as well as a rash. The rash may then become itchy.  - Common irritants causing irritant contact dermatitis include soaps and detergents, solvents, mild acids, and chemicals with a high pH (alkalis), fiberglass, hair products, bleach, rubber gloves, and plants.  Try to avoid further exposure to the irritant, if known, or protect the skin from re-exposure.  For irritated skin in body folds, consider applying a barrier cream with zinc oxide paste, such as Desitin.  - Apply petroleum jelly (eg, Vaseline) or a thick moisturizing cream (eg, CeraVe moisturizing cream) to wet skin after bathing or washing. Reapply at least twice daily to help further protect the affected skin areas.  - Start triamcinolone cream, use as directed.     4. Rash and other nonspecific skin eruption

## 2024-05-07 ENCOUNTER — APPOINTMENT (OUTPATIENT)
Dept: SLEEP MEDICINE | Facility: CLINIC | Age: 54
End: 2024-05-07
Payer: MEDICAID

## 2024-05-16 ENCOUNTER — APPOINTMENT (OUTPATIENT)
Dept: PRIMARY CARE | Facility: CLINIC | Age: 54
End: 2024-05-16
Payer: MEDICAID

## 2024-05-21 ENCOUNTER — APPOINTMENT (OUTPATIENT)
Dept: SLEEP MEDICINE | Facility: CLINIC | Age: 54
End: 2024-05-21
Payer: MEDICAID

## 2024-05-28 DIAGNOSIS — E55.9 VITAMIN D DEFICIENCY: ICD-10-CM

## 2024-05-28 RX ORDER — ERGOCALCIFEROL 1.25 MG/1
1 CAPSULE ORAL
Qty: 12 CAPSULE | Refills: 0 | Status: SHIPPED | OUTPATIENT
Start: 2024-05-28

## 2024-06-07 ENCOUNTER — TELEPHONE (OUTPATIENT)
Dept: CARDIOLOGY | Facility: CLINIC | Age: 54
End: 2024-06-07
Payer: MEDICAID

## 2024-06-07 DIAGNOSIS — I48.91 ATRIAL FIBRILLATION, UNSPECIFIED TYPE (MULTI): Primary | ICD-10-CM

## 2024-06-07 NOTE — TELEPHONE ENCOUNTER
Patient called in with concern that he has been having palpitations daily and that they are more intense than before. Patient denies lightheadedness, shortness of breath, or chest pain. He will come in for a 14 day monitor and follow up with EP NP in 3-4 weeks. Patient is agreeable to the plan. He was advised to go to ER if needed with new or worsening symptoms.

## 2024-06-10 ENCOUNTER — ANCILLARY PROCEDURE (OUTPATIENT)
Dept: CARDIOLOGY | Facility: CLINIC | Age: 54
End: 2024-06-10
Payer: MEDICAID

## 2024-06-10 DIAGNOSIS — I48.91 ATRIAL FIBRILLATION, UNSPECIFIED TYPE (MULTI): ICD-10-CM

## 2024-06-22 PROBLEM — R35.0 URINARY FREQUENCY: Status: ACTIVE | Noted: 2024-05-22

## 2024-06-22 PROBLEM — R31.1 BENIGN ESSENTIAL MICROSCOPIC HEMATURIA: Status: ACTIVE | Noted: 2024-05-22

## 2024-06-22 PROBLEM — Z86.39 PERSONAL HISTORY OF OTHER ENDOCRINE, NUTRITIONAL AND METABOLIC DISEASE: Status: ACTIVE | Noted: 2024-06-22

## 2024-06-22 PROBLEM — N20.0 KIDNEY STONES: Status: ACTIVE | Noted: 2024-06-03

## 2024-06-22 PROBLEM — Z86.79 PERSONAL HISTORY OF OTHER DISEASES OF THE CIRCULATORY SYSTEM: Status: ACTIVE | Noted: 2024-06-22

## 2024-06-22 PROBLEM — J02.9 ACUTE PHARYNGITIS: Status: ACTIVE | Noted: 2024-06-22

## 2024-06-22 RX ORDER — BENZONATATE 100 MG/1
CAPSULE ORAL
COMMUNITY
Start: 2024-04-12 | End: 2024-06-26 | Stop reason: WASHOUT

## 2024-06-24 NOTE — PROGRESS NOTES
Electrophysiology Follow up Visit    Julian Juarez is a 54 y.o. year old male patient with    1. Paroxysmal atrial fibrillation: For the past 15 years. Around 10-15 episodes in that time. Episodes last between 5 minutes to 8 hours. Usually occur at night or early in the morning. Feels palpitations, fluttering in the heart and some lightheadedness. Was treated with cardizem for years, but not consistently. Episodes continue to be infrequent. CHADSVasc: 0     2. Mild obstructive sleep apnea    We saw patient in November 2023 Patient claimed he continued to have occasional episodes of palpitations only at night which usually wake him up. At times he feels his heart pounding heavily but also at times it feels like his heart racing. Reviewed event monitor, some PAC's noted. Also noted somewhat elevated HR during the night ~90-100bpm. Appears to be sinus tachycardia. No AF detected. Patient scheduled to receive new CPAP equipment. He will call us to see how his palpitations are doing after he's on CPAP for a few weeks.     Patient with more palpitations and monitor placed on 6/10/2024. He was recent seen in emergency department 6/[facility] at [facility] for palpitations. Sinus rhythm/tachycardia at that time.    Patient here for follow up for atrial fibrillation and palpitations. Patient has noticed increased palpitations over the past couple months. Palpitations are occurring daily that last a second to 10 seconds, mainly occurring at rest. He denies syncope. He reports his beta blocker was stopped >1 year ago.     Medical History  He has a past medical history of Dizziness and giddiness (07/21/2022), Personal history of other diseases of the circulatory system, and Personal history of other endocrine, nutritional and metabolic disease.    Social History  He reports that he has never smoked. He has never used smokeless tobacco. He reports that he does not currently use alcohol. He reports that he does not use drugs.      FamHx:  "  Family History   Problem Relation Name Age of Onset    Diabetes Mother      Other (bilateral renal calculus) Mother      Sleep apnea Brother      Diabetes Paternal Grandmother      Other (bilateral renal calculus) Paternal Grandmother         No Known Allergies     Outpatient Medications:  Current Outpatient Medications   Medication Instructions    benzonatate (Tessalon) 100 mg capsule TAKE 1 CAPSULE BY MOUTH THREE TIMES DAILY FOR 10 DAYS    ergocalciferol (VITAMIN D-2) 1,250 mcg, oral, Once Weekly    fluticasone (Flonase) 50 mcg/actuation nasal spray     levothyroxine (SYNTHROID, LEVOXYL) 112 mcg, oral, Daily    omeprazole (PRILOSEC) 40 mg, oral, Daily    triamcinolone (Kenalog) 0.1 % cream Thin coat twice daily to affected areas on  for 3-4 weeks, then weekends only. Repeat every few months for flares.         Last Recorded Vitals: .vital      9/25/2023    10:05 AM 11/10/2023     3:43 PM 11/15/2023     2:22 PM 12/30/2023     8:10 PM 1/2/2024    11:40 AM 3/26/2024     9:04 AM 6/26/2024     8:16 AM   Vitals   Systolic 124 138 120 142 120 104 110   Diastolic 68 100 74 89 86 78 70   Heart Rate 92  95 85 79 61 79   Temp    37.3 °C (99.1 °F)  36.6 °C (97.9 °F)    Resp    16      Height (in) 1.778 m (5' 10\")  1.778 m (5' 10\") 1.778 m (5' 10\") 1.778 m (5' 10\") 1.778 m (5' 10\") 1.778 m (5' 10\")   Weight (lb) 211  226 225 225 224 224   BMI 30.28 kg/m2  32.43 kg/m2 32.28 kg/m2 32.28 kg/m2 32.14 kg/m2 32.14 kg/m2   BSA (m2) 2.17 m2  2.26 m2 2.24 m2 2.24 m2 2.24 m2 2.24 m2   Visit Report Report  Report  Report Report Report    Visit Vitals  /70   Pulse 79   Ht 1.778 m (5' 10\")   Wt 102 kg (224 lb)   BMI 32.14 kg/m²   Smoking Status Never   BSA 2.24 m²        Physical Exam  Constitutional:       Appearance: Normal appearance.   Eyes:      Pupils: Pupils are equal, round, and reactive to light.   Cardiovascular:      Rate and Rhythm: Normal rate and regular rhythm.      Heart sounds: Normal heart sounds.   Pulmonary:     "  Effort: Pulmonary effort is normal.      Breath sounds: Normal breath sounds.   Musculoskeletal:         General: Normal range of motion.   Skin:     General: Skin is warm and dry.   Neurological:      Mental Status: He is alert and oriented to person, place, and time.        Cardiac Testing Reviewed Study(s):  Echo (December/2023):   CONCLUSIONS:   1. Left ventricular systolic function is normal with a 55% estimated ejection fraction.   2. Spectral Doppler shows an impaired relaxation pattern of left ventricular diastolic filling.  Monitor (October/2023):   Average heart rate 81 bpm  <1% SVC burden  <1% PVC burden  No atrial fibrillation  Single SVT of 4 beats  Symptoms associated with brief SVT  ECGs (reviewed and my interpretation):   6/26/2024 sinus rhythm with rate of 79 bpm    Lab Results   Component Value Date    WBC 5.5 02/15/2024    HGB 15.2 02/15/2024    HCT 44.9 02/15/2024     02/15/2024    ALT 20 02/15/2024    AST 18 02/15/2024     02/15/2024    K 3.9 02/15/2024     02/15/2024    CREATININE 0.82 02/15/2024    BUN 13 02/15/2024    CO2 28 02/15/2024    TSH 3.36 02/15/2024    INR 1.0 01/29/2023       Assessment  Palpitations: Patient with increased palpitations. ECG today sinus rhythm with no ectopy. Patient wore monitor and sent it back earlier this week. He had multiple episodes of palpitations while wearing the monitor. Review of ECG from [facility] showed sinus tachycardia. Lab work reviewed from [facility] showed K 3.4 and remaining electrolyte, renal function, and TSH were normal. Patient had stopped metoprolol >1 year since he was not experiencing palpitations. We will restart low dose metoprolol. There is no indication that atrial fibrillation has recurred but we will review monitor to confirm.  We will call patient with results of monitor and assess if his palpitations have been suppressed on metoprolol.     Atrial fibrillation: No known recurrence. We will review monitor results when  available. OAC not indicated as CHADVasc score is 0.     Plan  Start metoprolol tart 25mg twice daily  We will call patient with monitor results      Exclusive of any other services or procedures performed, Kathie YANEZ APRN-CNP , spent 30 minutes in duration for this visit today.  This time consisted of chart review, obtaining history, and/or performing the exam as documented above as well as documenting the clinical information for the encounter in the electronic record, discussing treatment options, plans, and/or goals with patient, family, and/or caregiver, refilling medications, updating the electronic record, ordering medicines, lab work, imaging, referrals, and/or procedures as documented above and communicating with other [facility] professionals. I have discussed the results of laboratory, radiology, and cardiology studies with the patient and their family/caregiver.

## 2024-06-26 ENCOUNTER — APPOINTMENT (OUTPATIENT)
Dept: CARDIOLOGY | Facility: CLINIC | Age: 54
End: 2024-06-26
Payer: MEDICAID

## 2024-06-26 VITALS
WEIGHT: 224 LBS | DIASTOLIC BLOOD PRESSURE: 70 MMHG | HEART RATE: 79 BPM | HEIGHT: 70 IN | SYSTOLIC BLOOD PRESSURE: 110 MMHG | BODY MASS INDEX: 32.07 KG/M2

## 2024-06-26 DIAGNOSIS — R00.2 PALPITATIONS: ICD-10-CM

## 2024-06-26 DIAGNOSIS — I48.0 PAROXYSMAL ATRIAL FIBRILLATION (MULTI): Primary | ICD-10-CM

## 2024-06-26 PROCEDURE — 99214 OFFICE O/P EST MOD 30 MIN: CPT | Performed by: NURSE PRACTITIONER

## 2024-06-26 PROCEDURE — 1036F TOBACCO NON-USER: CPT | Performed by: NURSE PRACTITIONER

## 2024-06-26 PROCEDURE — 3008F BODY MASS INDEX DOCD: CPT | Performed by: NURSE PRACTITIONER

## 2024-06-26 RX ORDER — METOPROLOL TARTRATE 25 MG/1
25 TABLET, FILM COATED ORAL 2 TIMES DAILY
Qty: 60 TABLET | Refills: 11 | Status: SHIPPED | OUTPATIENT
Start: 2024-06-26 | End: 2025-06-26

## 2024-07-08 ENCOUNTER — OFFICE VISIT (OUTPATIENT)
Dept: CARDIOLOGY | Facility: CLINIC | Age: 54
End: 2024-07-08
Payer: MEDICAID

## 2024-07-08 ENCOUNTER — APPOINTMENT (OUTPATIENT)
Dept: GASTROENTEROLOGY | Facility: CLINIC | Age: 54
End: 2024-07-08
Payer: MEDICAID

## 2024-07-08 ENCOUNTER — APPOINTMENT (OUTPATIENT)
Dept: CARDIOLOGY | Facility: CLINIC | Age: 54
End: 2024-07-08
Payer: MEDICAID

## 2024-07-08 VITALS
HEART RATE: 71 BPM | HEIGHT: 70 IN | DIASTOLIC BLOOD PRESSURE: 78 MMHG | WEIGHT: 224 LBS | BODY MASS INDEX: 32.07 KG/M2 | SYSTOLIC BLOOD PRESSURE: 116 MMHG

## 2024-07-08 VITALS
OXYGEN SATURATION: 92 % | HEIGHT: 70 IN | BODY MASS INDEX: 31.78 KG/M2 | WEIGHT: 222 LBS | DIASTOLIC BLOOD PRESSURE: 73 MMHG | HEART RATE: 84 BPM | SYSTOLIC BLOOD PRESSURE: 109 MMHG

## 2024-07-08 DIAGNOSIS — K21.9 GASTROESOPHAGEAL REFLUX DISEASE WITHOUT ESOPHAGITIS: Primary | ICD-10-CM

## 2024-07-08 DIAGNOSIS — K30 FUNCTIONAL DYSPEPSIA: ICD-10-CM

## 2024-07-08 DIAGNOSIS — I48.0 PAROXYSMAL ATRIAL FIBRILLATION (MULTI): ICD-10-CM

## 2024-07-08 DIAGNOSIS — R00.2 PALPITATIONS: ICD-10-CM

## 2024-07-08 PROCEDURE — 1036F TOBACCO NON-USER: CPT | Performed by: INTERNAL MEDICINE

## 2024-07-08 PROCEDURE — 3008F BODY MASS INDEX DOCD: CPT | Performed by: INTERNAL MEDICINE

## 2024-07-08 PROCEDURE — 99214 OFFICE O/P EST MOD 30 MIN: CPT | Performed by: INTERNAL MEDICINE

## 2024-07-08 PROCEDURE — 99213 OFFICE O/P EST LOW 20 MIN: CPT | Performed by: INTERNAL MEDICINE

## 2024-07-08 PROCEDURE — 93000 ELECTROCARDIOGRAM COMPLETE: CPT | Performed by: INTERNAL MEDICINE

## 2024-07-08 RX ORDER — OMEPRAZOLE 20 MG/1
20 CAPSULE, DELAYED RELEASE ORAL
Qty: 30 CAPSULE | Refills: 11 | Status: SHIPPED | OUTPATIENT
Start: 2024-07-08 | End: 2025-07-08

## 2024-07-08 RX ORDER — METOPROLOL TARTRATE 50 MG/1
50 TABLET ORAL 2 TIMES DAILY
Qty: 180 TABLET | Refills: 3 | Status: SHIPPED | OUTPATIENT
Start: 2024-07-08 | End: 2025-07-08

## 2024-07-08 NOTE — PROGRESS NOTES
[facility] Gastroenterology    ASSESSMENT and PLAN:       Patient is a 53-year-old male presents for follow-up after EGD and colonoscopy        1. Dysphagia  -EGD with no abnormalities to correlate with patient's complaint of dysphagia biopsies negative for any acute pathology  -Esophagram negative for any pathology  -Has resolved at this time     2. Functional Dyspepsia   -EGD was negative for any structural etiology biopsies negative for any pathology  -Celiac's panel was negative for celiac's  -We will plan on gastric emptying study to rule out gastroparesis  -Most likely component of functional dyspepsia trial of FD guard patient structured to take as on the box  -Obtain CT abdomen pelvis negative for any acute pathology patient recommended to follow-up with PCP for the findings of SI joint osseous fusion  -Long discussion was held with the patient about possible trial of amitriptyline for functional dyspepsia side effects of the medication were discussed in detail with patient this time would like to hold off as his epigastric pain is mild and not affecting his activities of daily living patient follow-up in 12 months patient calling office of epigastric pain does worsen we will do trial of amitriptyline 25 mg nightly   - Continue Omeprazole 20mg daily          Gavin Leslie DO         Gastroenterology    [facility] [place] [facility]            Subjective   HISTORY OF PRESENT ILLNESS:     Chief Complaint  Follow-up (Dysphagia/dyspepsia/CT  / EGD/Colon 2023/Xray 5/2024)    History Of Present Illness:    Julian Juarez is a 54 y.o. male with a significant past medical history of functional dyspepsia, GERD who presents for consultation requested by his primary care provider (Guevara Varghese DO) presents for 1 year follow-up.  Overall patient seems to have some intermittent episodes of epigastric pain and some slow emptying of the esophagus.  However overall he states  "he is doing well.  He has been maintained on 40 mg of omeprazole daily..         Patient denies any heartburn/GERD, N/V, dysphagia, odynophagia, abdominal pain, diarrhea, constipation, hematemesis, hematochezia, melena, or weight loss.    Review of systems:   Review of Systems      I performed a complete 10 point review of systems and it is negative except as noted in HPI or above.        PAST HISTORIES:       Past Medical History:  He has a past medical history of Dizziness and giddiness (07/21/2022), Personal history of other diseases of the circulatory system, and Personal history of other endocrine, nutritional and metabolic disease.    Past Surgical History:  He has a past surgical history that includes CT angio coronary art with heartflow if score >30% (1/31/2022) and CT angio coronary art with heartflow if score >30% (2/24/2023).      Social History:  He reports that he has never smoked. He has never used smokeless tobacco. He reports that he does not currently use alcohol. He reports that he does not use drugs.    Family History:  No known GI disease, specifically denies pancreatitis, Crohn's, colon cancer, gastroesophageal cancer, or ulcerative colitis.    Family History   Problem Relation Name Age of Onset    Diabetes Mother      Other (bilateral renal calculus) Mother      Sleep apnea Brother      Diabetes Paternal Grandmother      Other (bilateral renal calculus) Paternal Grandmother          Allergies:  Patient has no known allergies.        Objective   OBJECTIVE:       Last Recorded Vitals:  Vitals:    07/08/24 1304   BP: 109/73   Pulse: 84   SpO2: 92%   Weight: 101 kg (222 lb)   Height: 1.778 m (5' 10\")     /73   Pulse 84   Ht 1.778 m (5' 10\")   Wt 101 kg (222 lb)   SpO2 92%   BMI 31.85 kg/m²      Physical Exam:    Physical Exam  Constitutional:       Appearance: Normal appearance.   HENT:      Mouth/Throat:      Mouth: Mucous membranes are moist.   Eyes:      Extraocular Movements: " Extraocular movements intact.   Cardiovascular:      Rate and Rhythm: Normal rate and regular rhythm.      Heart sounds: Normal heart sounds.   Pulmonary:      Effort: Pulmonary effort is normal. No respiratory distress.      Breath sounds: Normal breath sounds. No wheezing.   Abdominal:      General: Abdomen is flat. Bowel sounds are normal. There is no distension.      Palpations: Abdomen is soft.      Tenderness: There is no abdominal tenderness. There is no rebound.   Musculoskeletal:         General: Normal range of motion.   Skin:     General: Skin is warm and dry.   Neurological:      General: No focal deficit present.      Mental Status: He is alert and oriented to person, place, and time. Mental status is at baseline.   Psychiatric:         Mood and Affect: Mood normal.         Behavior: Behavior normal.             Home Medications:  Prior to Admission medications    Medication Sig Start Date End Date Taking? Authorizing Provider   ergocalciferol (Vitamin D-2) 1.25 MG (05978 UT) capsule Take 1 capsule by mouth once a week 5/28/24  Yes Guevara Varghese, DO   fluticasone (Flonase) 50 mcg/actuation nasal spray  12/28/23  Yes Historical Provider, MD   levothyroxine (Synthroid, Levoxyl) 112 mcg tablet Take 1 tablet (112 mcg) by mouth once daily. 7/10/23 7/9/24 Yes Guevara Varghese, DO   metoprolol tartrate (Lopressor) 50 mg tablet Take 1 tablet (50 mg) by mouth 2 times a day. 7/8/24 7/8/25 Yes Fermín Ann MD   omeprazole (PriLOSEC) 40 mg DR capsule Take 1 capsule by mouth once daily 12/19/23  Yes Gavin Leslie, DO   triamcinolone (Kenalog) 0.1 % cream Thin coat twice daily to affected areas on  for 3-4 weeks, then weekends only. Repeat every few months for flares. 4/3/24  Yes Susan L Mayne, APRN-CNP   metoprolol tartrate (Lopressor) 25 mg tablet Take 1 tablet (25 mg) by mouth 2 times a day. 6/26/24 7/8/24  TONEY Richards         Relevant Results Recent labs reviewed in the  "EMR.  Lab Results   Component Value Date    HGB 15.2 02/15/2024    HGB 15.0 09/27/2023    HGB 15.0 02/26/2023    HGB 13.9 01/29/2023    MCV 89 02/15/2024    MCV 90 09/27/2023    MCV 87 02/26/2023    MCV 87 01/29/2023     02/15/2024     09/27/2023     02/26/2023     01/29/2023       No results found for: \"FERRITIN\", \"IRON\"    Lab Results   Component Value Date     02/15/2024    K 3.9 02/15/2024     02/15/2024    BUN 13 02/15/2024    CREATININE 0.82 02/15/2024       Lab Results   Component Value Date    BILITOT 0.3 02/15/2024     Lab Results   Component Value Date    ALT 20 02/15/2024    ALT 19 09/27/2023    ALT CANCELED 02/26/2023    ALT 29 02/26/2023    AST 18 02/15/2024    AST 17 09/27/2023    AST CANCELED 02/26/2023    AST 18 02/26/2023    ALKPHOS 62 02/15/2024    ALKPHOS 59 09/27/2023    ALKPHOS CANCELED 02/26/2023    ALKPHOS 55 02/26/2023       No results found for: \"CRP\"    No results found for: \"CALPS\"    Radiology: Reviewed imaging reviewed in the EMR.  ECG 12 lead (Clinic Performed)    Result Date: 7/8/2024  See scanned report    XR chest 2 views    Result Date: 6/20/2024  * * *Final Report* * * DATE OF EXAM: Jun 20 2024  7:11PM   RHX   5291  -  XR CHEST 2V FRONTAL/LAT  / ACCESSION #  [id number] PROCEDURE REASON: Chest Pain      * * * * Physician Interpretation * * * *  EXAMINATION:  CHEST RADIOGRAPH (2 VIEW FRONTAL & LATERAL) CLINICAL HISTORY: Chest Pain MQ:  XC2_6 EXAM DATE/TIME:  6/20/2024 7:11 PM COMPARISON:  No relevant prior studies available. RESULT: Lines, tubes, and devices:  None. Lungs and pleura:  No consolidation. No lung mass. No pleural effusion. No pneumothorax. Cardiomediastinal silhouette:  Normal cardiomediastinal silhouette. Bones and soft tissues:  Unremarkable.    IMPRESSION: No acute radiographic abnormality. : DANIA   Transcribe Date/Time: Jun 20 2024  7:25P Dictated by : LOAN AVILA MD This examination was interpreted and the " report reviewed and electronically signed by: LOAN AVILA MD on Jun 20 2024  7:25PM  EST

## 2024-07-08 NOTE — PROGRESS NOTES
[facility] Heart [facility] Vascular Electrophysiology    Patient Name: Julian Juarez  Patient : 1970    Referred  for No chief complaint on file.       Julian Juarez is a 54 y.o. year old male patient with    1. Paroxysmal atrial fibrillation: For the past 15 years. Around 10-15 episodes in that time. Episodes last between 5 minutes to 8 hours. Usually occur at night or early in the morning. Feels palpitations, fluttering in the heart and some lightheadedness. Was treated with cardizem for years, but not consistently. Episodes continue to be infrequent. CHADSVasc: 0. Started on metoprolol during his last visit.      2. Mild obstructive sleep apnea    Past Medical History:  He has a past medical history of Dizziness and giddiness (2022), Personal history of other diseases of the circulatory system, and Personal history of other endocrine, nutritional and metabolic disease.    Past Surgical History:  He has a past surgical history that includes CT angio coronary art with heartflow if score >30% (2022) and CT angio coronary art with heartflow if score >30% (2023).      Social History:  He reports that he has never smoked. He has never used smokeless tobacco. He reports that he does not currently use alcohol. He reports that he does not use drugs.    Family History:  Family History   Problem Relation Name Age of Onset    Diabetes Mother      Other (bilateral renal calculus) Mother      Sleep apnea Brother      Diabetes Paternal Grandmother      Other (bilateral renal calculus) Paternal Grandmother          Allergies:  Patient has no known allergies.    Outpatient Medications:  Current Outpatient Medications   Medication Instructions    ergocalciferol (VITAMIN D-2) 1,250 mcg, oral, Once Weekly    fluticasone (Flonase) 50 mcg/actuation nasal spray     levothyroxine (SYNTHROID, LEVOXYL) 112 mcg, oral, Daily    metoprolol tartrate (LOPRESSOR) 25 mg, oral, 2 times daily    omeprazole (PRILOSEC) 40  "mg, oral, Daily    triamcinolone (Kenalog) 0.1 % cream Thin coat twice daily to affected areas on  for 3-4 weeks, then weekends only. Repeat every few months for flares.        ROS:  A 14 point review of systems was done and is negative other than as stated in HPI    Vitals:  Vitals:    07/08/24 0916   BP: 116/78   Pulse: 71   Weight: 102 kg (224 lb)   Height: 1.778 m (5' 10\")       Physical Exam:   Physical Exam  Constitutional:       General: He is not in acute distress.     Appearance: Normal appearance.   Cardiovascular:      Rate and Rhythm: Normal rate and regular rhythm.   Musculoskeletal:         General: No swelling.   Skin:     General: Skin is warm and dry.   Neurological:      Mental Status: He is alert.          Intake/Output:   No intake/output data recorded.    Outpatient Medications  Current Outpatient Medications on File Prior to Visit   Medication Sig Dispense Refill    ergocalciferol (Vitamin D-2) 1.25 MG (69387 UT) capsule Take 1 capsule by mouth once a week 12 capsule 0    fluticasone (Flonase) 50 mcg/actuation nasal spray       levothyroxine (Synthroid, Levoxyl) 112 mcg tablet Take 1 tablet (112 mcg) by mouth once daily. 30 tablet 11    metoprolol tartrate (Lopressor) 25 mg tablet Take 1 tablet (25 mg) by mouth 2 times a day. 60 tablet 11    omeprazole (PriLOSEC) 40 mg DR capsule Take 1 capsule by mouth once daily 90 capsule 0    triamcinolone (Kenalog) 0.1 % cream Thin coat twice daily to affected areas on  for 3-4 weeks, then weekends only. Repeat every few months for flares. 80 g 3     No current facility-administered medications on file prior to visit.       Labs: (past 26 weeks)  Recent Results (from the past 4368 hour(s))   Thyroid Stimulating Hormone    Collection Time: 02/15/24  2:44 PM   Result Value Ref Range    Thyroid Stimulating Hormone 3.36 0.44 - 3.98 mIU/L   Triiodothyronine, Free    Collection Time: 02/15/24  2:44 PM   Result Value Ref Range    Triiodothyronine, Free 3.6 2.3 - " 4.2 pg/mL   Thyroxine, Free    Collection Time: 02/15/24  2:44 PM   Result Value Ref Range    Thyroxine, Free 0.86 0.61 - 1.12 ng/dL   CBC and Auto Differential    Collection Time: 02/15/24  2:44 PM   Result Value Ref Range    WBC 5.5 4.4 - 11.3 x10*3/uL    nRBC 0.0 0.0 - 0.0 /100 WBCs    RBC 5.03 4.50 - 5.90 x10*6/uL    Hemoglobin 15.2 13.5 - 17.5 g/dL    Hematocrit 44.9 41.0 - 52.0 %    MCV 89 80 - 100 fL    MCH 30.2 26.0 - 34.0 pg    MCHC 33.9 32.0 - 36.0 g/dL    RDW 12.5 11.5 - 14.5 %    Platelets 361 150 - 450 x10*3/uL    Neutrophils % 53.1 40.0 - 80.0 %    Immature Granulocytes %, Automated 0.5 0.0 - 0.9 %    Lymphocytes % 34.8 13.0 - 44.0 %    Monocytes % 8.3 2.0 - 10.0 %    Eosinophils % 2.0 0.0 - 6.0 %    Basophils % 1.3 0.0 - 2.0 %    Neutrophils Absolute 2.92 1.20 - 7.70 x10*3/uL    Immature Granulocytes Absolute, Automated 0.03 0.00 - 0.70 x10*3/uL    Lymphocytes Absolute 1.92 1.20 - 4.80 x10*3/uL    Monocytes Absolute 0.46 0.10 - 1.00 x10*3/uL    Eosinophils Absolute 0.11 0.00 - 0.70 x10*3/uL    Basophils Absolute 0.07 0.00 - 0.10 x10*3/uL   Comprehensive metabolic panel    Collection Time: 02/15/24  2:44 PM   Result Value Ref Range    Glucose 109 (H) 74 - 99 mg/dL    Sodium 140 136 - 145 mmol/L    Potassium 3.9 3.5 - 5.3 mmol/L    Chloride 105 98 - 107 mmol/L    Bicarbonate 28 21 - 32 mmol/L    Anion Gap 11 10 - 20 mmol/L    Urea Nitrogen 13 6 - 23 mg/dL    Creatinine 0.82 0.50 - 1.30 mg/dL    eGFR >90 >60 mL/min/1.73m*2    Calcium 8.8 8.6 - 10.3 mg/dL    Albumin 4.0 3.4 - 5.0 g/dL    Alkaline Phosphatase 62 33 - 120 U/L    Total Protein 6.2 (L) 6.4 - 8.2 g/dL    AST 18 9 - 39 U/L    Bilirubin, Total 0.3 0.0 - 1.2 mg/dL    ALT 20 10 - 52 U/L       ECG  No results found for this or any previous visit (from the past 4464 hour(s)).    Echocardiogram  No results found for this or any previous visit from the past 1095 days.      Assessment/Plan:     Event monitor reviewed. Very low burden PAC's/PVC's which  correlate with patient's symptoms. Discussed findings with him in detail We will increase beta blockers. Reassurance provided. Will opt against escalating medical therapy beyond this. He will continue to follow with Dr. Jessica. If worsening episodes, flecainide may be trialed but I believe given such a low bruden we should avoid AAD's.

## 2024-07-15 ENCOUNTER — APPOINTMENT (OUTPATIENT)
Dept: CARDIOLOGY | Facility: CLINIC | Age: 54
End: 2024-07-15
Payer: MEDICAID

## 2024-07-15 DIAGNOSIS — E06.3 HASHIMOTO'S THYROIDITIS: ICD-10-CM

## 2024-07-15 RX ORDER — LEVOTHYROXINE SODIUM 112 UG/1
112 TABLET ORAL DAILY
Qty: 30 TABLET | Refills: 0 | Status: SHIPPED | OUTPATIENT
Start: 2024-07-15

## 2024-08-20 DIAGNOSIS — E55.9 VITAMIN D DEFICIENCY: ICD-10-CM

## 2024-08-20 RX ORDER — ERGOCALCIFEROL 1.25 MG/1
1 CAPSULE ORAL
Qty: 12 CAPSULE | Refills: 0 | Status: SHIPPED | OUTPATIENT
Start: 2024-08-20

## 2024-08-22 ENCOUNTER — APPOINTMENT (OUTPATIENT)
Dept: SLEEP MEDICINE | Facility: CLINIC | Age: 54
End: 2024-08-22
Payer: MEDICAID

## 2024-08-25 ENCOUNTER — HOSPITAL ENCOUNTER (EMERGENCY)
Facility: HOSPITAL | Age: 54
Discharge: HOME | End: 2024-08-26
Payer: MEDICAID

## 2024-08-25 VITALS
DIASTOLIC BLOOD PRESSURE: 90 MMHG | RESPIRATION RATE: 16 BRPM | HEART RATE: 74 BPM | WEIGHT: 220 LBS | BODY MASS INDEX: 31.5 KG/M2 | TEMPERATURE: 98.4 F | OXYGEN SATURATION: 95 % | HEIGHT: 70 IN | SYSTOLIC BLOOD PRESSURE: 132 MMHG

## 2024-08-25 DIAGNOSIS — R22.42 LOCALIZED SWELLING OF LEFT FOOT: Primary | ICD-10-CM

## 2024-08-25 PROCEDURE — 99283 EMERGENCY DEPT VISIT LOW MDM: CPT

## 2024-08-25 PROCEDURE — 99284 EMERGENCY DEPT VISIT MOD MDM: CPT | Mod: 25

## 2024-08-25 RX ORDER — CEPHALEXIN 250 MG/1
500 CAPSULE ORAL ONCE
Status: COMPLETED | OUTPATIENT
Start: 2024-08-26 | End: 2024-08-26

## 2024-08-25 RX ORDER — CETIRIZINE HYDROCHLORIDE 10 MG/1
10 TABLET ORAL ONCE
Status: COMPLETED | OUTPATIENT
Start: 2024-08-26 | End: 2024-08-26

## 2024-08-25 RX ORDER — PREDNISONE 10 MG/1
40 TABLET ORAL ONCE
Status: COMPLETED | OUTPATIENT
Start: 2024-08-26 | End: 2024-08-26

## 2024-08-25 ASSESSMENT — COLUMBIA-SUICIDE SEVERITY RATING SCALE - C-SSRS
1. IN THE PAST MONTH, HAVE YOU WISHED YOU WERE DEAD OR WISHED YOU COULD GO TO SLEEP AND NOT WAKE UP?: NO
6. HAVE YOU EVER DONE ANYTHING, STARTED TO DO ANYTHING, OR PREPARED TO DO ANYTHING TO END YOUR LIFE?: NO
2. HAVE YOU ACTUALLY HAD ANY THOUGHTS OF KILLING YOURSELF?: NO

## 2024-08-26 ENCOUNTER — APPOINTMENT (OUTPATIENT)
Dept: RADIOLOGY | Facility: HOSPITAL | Age: 54
End: 2024-08-26
Payer: MEDICAID

## 2024-08-26 PROCEDURE — 2500000004 HC RX 250 GENERAL PHARMACY W/ HCPCS (ALT 636 FOR OP/ED): Performed by: NURSE PRACTITIONER

## 2024-08-26 PROCEDURE — 93971 EXTREMITY STUDY: CPT

## 2024-08-26 PROCEDURE — 93971 EXTREMITY STUDY: CPT | Performed by: RADIOLOGY

## 2024-08-26 PROCEDURE — 2500000001 HC RX 250 WO HCPCS SELF ADMINISTERED DRUGS (ALT 637 FOR MEDICARE OP): Performed by: NURSE PRACTITIONER

## 2024-08-26 RX ORDER — CETIRIZINE HYDROCHLORIDE 1 MG/ML
10 SOLUTION ORAL DAILY
Qty: 300 ML | Refills: 0 | Status: SHIPPED | OUTPATIENT
Start: 2024-08-26 | End: 2024-09-25

## 2024-08-26 RX ORDER — CEPHALEXIN 250 MG/5ML
500 POWDER, FOR SUSPENSION ORAL 4 TIMES DAILY
Qty: 280 ML | Refills: 0 | Status: SHIPPED | OUTPATIENT
Start: 2024-08-26 | End: 2024-09-02

## 2024-08-26 NOTE — ED PROVIDER NOTES
HPI   Chief Complaint   Patient presents with    .     Left foot/ankle pain, denies injury. Started last week but got worse on the 22nd. Has some swelling and redness       54-year-old male with a past history of Hashimoto presents to the emergency department today complaining of left foot swelling and erythema.  Patient states that he noticed this last week but has been worsening and swelling since the 22nd.  States that his itching.  There is no calf pain.  No traumatic injury.  Denies any fever or chills.  He has a small amount of erythema.  There is no streaking noted.  No history of this in the past.  He did take some anti-inflammatories at home with no significant improvement.  Denies any further concerns.  No chest pain, shortness of breath, fever, chills, nausea, vomiting.  No new lotions, foods or medications.                          [place] Coma Scale Score: 15                  Patient History   Past Medical History:   Diagnosis Date    Dizziness and giddiness 07/21/2022    Lightheadedness    Personal history of other diseases of the circulatory system     History of atrial fibrillation    Personal history of other endocrine, nutritional and metabolic disease     History of Hashimoto thyroiditis     Past Surgical History:   Procedure Laterality Date    CT ANGIO CORONARY ART WITH HEARTFLOW IF SCORE >30%  1/31/2022    CT HEART CORONARY ANGIOGRAM 1/31/2022 [facility] ANCILLARY LEGACY    CT ANGIO CORONARY ART WITH HEARTFLOW IF SCORE >30%  2/24/2023    CT HEART CORONARY ANGIOGRAM [facility] CT     Family History   Problem Relation Name Age of Onset    Diabetes Mother      Other (bilateral renal calculus) Mother      Sleep apnea Brother      Diabetes Paternal Grandmother      Other (bilateral renal calculus) Paternal Grandmother       Social History     Tobacco Use    Smoking status: Never    Smokeless tobacco: Never   Substance Use Topics    Alcohol use: Not Currently    Drug use: Never       Physical Exam   ED Triage  Vitals [08/25/24 2345]   Temperature Heart Rate Respirations BP   36.9 °C (98.4 °F) 74 16 132/90      Pulse Ox Temp Source Heart Rate Source Patient Position   95 % Tympanic -- --      BP Location FiO2 (%)     -- --       Physical Exam  Vitals reviewed.   Constitutional:       Appearance: Normal appearance.   HENT:      Head: Normocephalic.   Cardiovascular:      Rate and Rhythm: Normal rate and regular rhythm.      Pulses: Normal pulses.   Pulmonary:      Effort: Pulmonary effort is normal.      Breath sounds: Normal breath sounds.   Abdominal:      General: Abdomen is flat.      Palpations: Abdomen is soft.   Musculoskeletal:      Right lower leg: No edema.      Left lower leg: Edema (No calf pain or swelling.  Swelling is dependent to the left foot.  There is a small erythematous region to the anterior aspect that could represent a bite.  It is warm to touch.  No lymphangitic streaking.  Pruritic in nature) present.   Skin:     General: Skin is warm and dry.      Capillary Refill: Capillary refill takes less than 2 seconds.   Neurological:      Mental Status: He is alert.         Labs Reviewed - No data to display  Pain Management Panel           No data to display              Vascular US lower extremity venous duplex left             ED Course & MDM   Diagnoses as of 08/26/24 0101   Localized swelling of left foot       Medical Decision Making  Unusually region patient is well-appearing emergency department at this time.  He does have swelling of the left foot with slight erythema and warmth.  Antihistamine reaction versus cellulitis versus DVT.  Lower suspicion for DVT as there is no calf pain or swelling is localized to the foot.  He is ambulatory and there is no bony tenderness.  There is no traumatic injury.  Shared decision making to not perform any radiographic imaging of the foot itself.  DVT scan was negative for DVT.  He was given Keflex, cetirizine and prednisone in the emergency department.  Will  cover with Keflex and cetirizine for antihistamine versus cellulitic reaction.  He is aware strict return precautions close outpatient follow-up he verbalized understanding of the plan he has no further questions or concerns and patient will be discharged home in stable condition.        Procedure  Procedures        I discussed the differential, results and discharge plan with the patient and/or family/friend/caregiver if present.  I emphasized the importance of follow-up with the physician I referred them to in the timeframe recommended.  I explained reasons for the patient to return to the Emergency Department. Additional verbal discharge instructions were also given and discussed with the patient to supplement those generated by the EMR. We also discussed medications that were prescribed (if any) including common side effects and interactions. The patient was advised to abstain from driving, operating heavy machinery or making significant decisions while taking medications such as opiates and muscle relaxers that may impair this. All questions were addressed.  They understand return precautions and discharge instructions. The patient and/or family/friend/caregiver expressed understanding.           Maggy Kam, LINDA-CNP  08/26/24 0102

## 2024-09-09 DIAGNOSIS — E06.3 HASHIMOTO'S THYROIDITIS: ICD-10-CM

## 2024-09-09 RX ORDER — LEVOTHYROXINE SODIUM 112 UG/1
112 TABLET ORAL DAILY
Qty: 30 TABLET | Refills: 11 | Status: SHIPPED | OUTPATIENT
Start: 2024-09-09

## 2024-09-11 ENCOUNTER — APPOINTMENT (OUTPATIENT)
Dept: PRIMARY CARE | Facility: CLINIC | Age: 54
End: 2024-09-11
Payer: MEDICAID

## 2024-09-23 ENCOUNTER — LAB (OUTPATIENT)
Dept: LAB | Facility: LAB | Age: 54
End: 2024-09-23
Payer: MEDICAID

## 2024-09-23 DIAGNOSIS — R09.89 RHONCHI AT RIGHT LUNG BASE: ICD-10-CM

## 2024-09-23 DIAGNOSIS — I48.0 PAROXYSMAL ATRIAL FIBRILLATION (MULTI): ICD-10-CM

## 2024-09-23 DIAGNOSIS — E06.3 HASHIMOTO'S THYROIDITIS: ICD-10-CM

## 2024-09-23 DIAGNOSIS — E55.9 VITAMIN D DEFICIENCY: ICD-10-CM

## 2024-09-23 DIAGNOSIS — E53.8 VITAMIN B12 DEFICIENCY: ICD-10-CM

## 2024-09-23 LAB
25(OH)D3 SERPL-MCNC: 20 NG/ML (ref 30–100)
ALBUMIN SERPL BCP-MCNC: 4.1 G/DL (ref 3.4–5)
ALP SERPL-CCNC: 52 U/L (ref 33–120)
ALT SERPL W P-5'-P-CCNC: 15 U/L (ref 10–52)
ANION GAP SERPL CALC-SCNC: 9 MMOL/L (ref 10–20)
AST SERPL W P-5'-P-CCNC: 16 U/L (ref 9–39)
BASOPHILS # BLD AUTO: 0.06 X10*3/UL (ref 0–0.1)
BASOPHILS NFR BLD AUTO: 1.2 %
BILIRUB SERPL-MCNC: 0.6 MG/DL (ref 0–1.2)
BUN SERPL-MCNC: 15 MG/DL (ref 6–23)
CALCIUM SERPL-MCNC: 8.1 MG/DL (ref 8.6–10.3)
CHLORIDE SERPL-SCNC: 105 MMOL/L (ref 98–107)
CHOLEST SERPL-MCNC: 165 MG/DL (ref 0–199)
CHOLESTEROL/HDL RATIO: 2.7
CO2 SERPL-SCNC: 27 MMOL/L (ref 21–32)
CREAT SERPL-MCNC: 0.84 MG/DL (ref 0.5–1.3)
EGFRCR SERPLBLD CKD-EPI 2021: >90 ML/MIN/1.73M*2
EOSINOPHIL # BLD AUTO: 0.11 X10*3/UL (ref 0–0.7)
EOSINOPHIL NFR BLD AUTO: 2.3 %
ERYTHROCYTE [DISTWIDTH] IN BLOOD BY AUTOMATED COUNT: 12.6 % (ref 11.5–14.5)
GLUCOSE SERPL-MCNC: 108 MG/DL (ref 74–99)
HCT VFR BLD AUTO: 44.7 % (ref 41–52)
HDLC SERPL-MCNC: 61.7 MG/DL
HGB BLD-MCNC: 15.2 G/DL (ref 13.5–17.5)
IMM GRANULOCYTES # BLD AUTO: 0.02 X10*3/UL (ref 0–0.7)
IMM GRANULOCYTES NFR BLD AUTO: 0.4 % (ref 0–0.9)
LDLC SERPL CALC-MCNC: 87 MG/DL
LYMPHOCYTES # BLD AUTO: 1.83 X10*3/UL (ref 1.2–4.8)
LYMPHOCYTES NFR BLD AUTO: 37.6 %
MCH RBC QN AUTO: 30.3 PG (ref 26–34)
MCHC RBC AUTO-ENTMCNC: 34 G/DL (ref 32–36)
MCV RBC AUTO: 89 FL (ref 80–100)
MONOCYTES # BLD AUTO: 0.44 X10*3/UL (ref 0.1–1)
MONOCYTES NFR BLD AUTO: 9 %
NEUTROPHILS # BLD AUTO: 2.41 X10*3/UL (ref 1.2–7.7)
NEUTROPHILS NFR BLD AUTO: 49.5 %
NON HDL CHOLESTEROL: 103 MG/DL (ref 0–149)
NRBC BLD-RTO: 0 /100 WBCS (ref 0–0)
PLATELET # BLD AUTO: 354 X10*3/UL (ref 150–450)
POTASSIUM SERPL-SCNC: 3.9 MMOL/L (ref 3.5–5.3)
PROT SERPL-MCNC: 6.1 G/DL (ref 6.4–8.2)
RBC # BLD AUTO: 5.02 X10*6/UL (ref 4.5–5.9)
SODIUM SERPL-SCNC: 137 MMOL/L (ref 136–145)
T3FREE SERPL-MCNC: 3.5 PG/ML (ref 2.3–4.2)
T4 FREE SERPL-MCNC: 0.95 NG/DL (ref 0.61–1.12)
TRIGL SERPL-MCNC: 82 MG/DL (ref 0–149)
TSH SERPL-ACNC: 2.29 MIU/L (ref 0.44–3.98)
VIT B12 SERPL-MCNC: 291 PG/ML (ref 211–911)
VLDL: 16 MG/DL (ref 0–40)
WBC # BLD AUTO: 4.9 X10*3/UL (ref 4.4–11.3)

## 2024-09-23 PROCEDURE — 82607 VITAMIN B-12: CPT

## 2024-09-23 PROCEDURE — 82306 VITAMIN D 25 HYDROXY: CPT

## 2024-09-23 PROCEDURE — 85025 COMPLETE CBC W/AUTO DIFF WBC: CPT

## 2024-09-23 PROCEDURE — 36415 COLL VENOUS BLD VENIPUNCTURE: CPT

## 2024-09-23 PROCEDURE — 80061 LIPID PANEL: CPT

## 2024-09-23 PROCEDURE — 84443 ASSAY THYROID STIM HORMONE: CPT

## 2024-09-23 PROCEDURE — 84439 ASSAY OF FREE THYROXINE: CPT

## 2024-09-23 PROCEDURE — 80053 COMPREHEN METABOLIC PANEL: CPT

## 2024-09-23 PROCEDURE — 84481 FREE ASSAY (FT-3): CPT

## 2024-09-25 ENCOUNTER — APPOINTMENT (OUTPATIENT)
Dept: PRIMARY CARE | Facility: CLINIC | Age: 54
End: 2024-09-25
Payer: MEDICAID

## 2024-09-25 VITALS
DIASTOLIC BLOOD PRESSURE: 84 MMHG | WEIGHT: 222 LBS | HEART RATE: 72 BPM | BODY MASS INDEX: 31.78 KG/M2 | OXYGEN SATURATION: 97 % | SYSTOLIC BLOOD PRESSURE: 122 MMHG | HEIGHT: 70 IN | RESPIRATION RATE: 18 BRPM

## 2024-09-25 DIAGNOSIS — E06.3 HASHIMOTO'S THYROIDITIS: ICD-10-CM

## 2024-09-25 DIAGNOSIS — E66.9 OBESITY, CLASS I, BMI 30-34.9: ICD-10-CM

## 2024-09-25 DIAGNOSIS — R73.03 PREDIABETES: ICD-10-CM

## 2024-09-25 DIAGNOSIS — E55.9 VITAMIN D DEFICIENCY: ICD-10-CM

## 2024-09-25 DIAGNOSIS — G47.33 OBSTRUCTIVE SLEEP APNEA: ICD-10-CM

## 2024-09-25 DIAGNOSIS — Z00.00 WELL ADULT EXAM: Primary | ICD-10-CM

## 2024-09-25 DIAGNOSIS — K21.9 GASTROESOPHAGEAL REFLUX DISEASE, UNSPECIFIED WHETHER ESOPHAGITIS PRESENT: ICD-10-CM

## 2024-09-25 DIAGNOSIS — E53.8 VITAMIN B12 DEFICIENCY: ICD-10-CM

## 2024-09-25 DIAGNOSIS — F41.9 ANXIETY: ICD-10-CM

## 2024-09-25 DIAGNOSIS — I48.0 PAROXYSMAL ATRIAL FIBRILLATION (MULTI): ICD-10-CM

## 2024-09-25 PROCEDURE — 90471 IMMUNIZATION ADMIN: CPT | Performed by: FAMILY MEDICINE

## 2024-09-25 PROCEDURE — 99396 PREV VISIT EST AGE 40-64: CPT | Performed by: FAMILY MEDICINE

## 2024-09-25 PROCEDURE — 1036F TOBACCO NON-USER: CPT | Performed by: FAMILY MEDICINE

## 2024-09-25 PROCEDURE — 90656 IIV3 VACC NO PRSV 0.5 ML IM: CPT | Performed by: FAMILY MEDICINE

## 2024-09-25 PROCEDURE — 3008F BODY MASS INDEX DOCD: CPT | Performed by: FAMILY MEDICINE

## 2024-09-25 RX ORDER — ERGOCALCIFEROL 1.25 MG/1
1 CAPSULE ORAL
Qty: 12 CAPSULE | Refills: 0 | Status: SHIPPED | OUTPATIENT
Start: 2024-09-29

## 2024-09-25 ASSESSMENT — PATIENT HEALTH QUESTIONNAIRE - PHQ9
2. FEELING DOWN, DEPRESSED OR HOPELESS: NOT AT ALL
1. LITTLE INTEREST OR PLEASURE IN DOING THINGS: NOT AT ALL
SUM OF ALL RESPONSES TO PHQ9 QUESTIONS 1 AND 2: 0

## 2024-09-25 NOTE — PROGRESS NOTES
Subjective   Patient ID: Julian Juarez is a 54 y.o. male who presents for well exam  HPI    Concerns today:      In general the patient states that his health is:  Good.     Regular dental visits: Yes and getting crown  Vision problems: No changes in the vision  Hearing loss:  No changes    Diet:  He is trying to get back to a good diet.  He is not eating a lot of fast food or fried food.   Exercise:  Has been doing a little but plans on  getting back to the gym  Weight concerns:  yes  Sleep:  Yes  Caffeine:  None  Water: Water intake is good    Reproductive health:  Sexually active: yes  Urinary difficulties: none.  Sees urology Dr Lujan    Colon cancer screening:  Last done: 2023  Colonoscopy     10 year follow up    Review of Systems    Objective   Physical Exam  CN II general: Patient is alert and oriented ×3 and appears in no acute distress. No respiratory distress.    Head: Atraumatic normocephalic.    Eyes: EOMI, PERRLA      Ears: Canals patent without any irritation, tympanic membranes without inflammation, no swelling, normal light reflex.    Nose: Nares patent. Turbinates are not swollen. No discharge.    Mouth: Normal mucosa. Moist. No erythema, exudates, tonsillar enlargement.    Neck: Normal range of motion, no masses.  Thyroid is palpable and normal in size without any nodules. No anterior cervical or posterior cervical adenopathy.    Heart: Regular rate and rhythm, no murmurs clicks or gallops    Lungs: Clear to auscultation bilaterally without any rhonchi rales or wheezing, lung sounds heard throughout all lung fields    Abdomen: Soft, nontender, no rigidity, rebound, guarding or organomegaly. Bowel sounds ×4 quadrants.    Musculoskeletal: Normal range of motion, strength is grossly intact in the proximal distal muscles of the upper and lower extremities bilaterally, deep tendon reflexes +2 out of 4 and symmetric bilaterally at the patella, Achilles, biceps, triceps, sensation intact.    Nerves:  Cranial nerves II through XII appear grossly intact and without deficit    Skin: Intact, dry, no rashes or erythema    Psych: Normal affect.   Assessment/Plan   Problem List Items Addressed This Visit       Vitamin D deficiency    Relevant Medications    ergocalciferol (Vitamin D-2) 1.25 MG (88261 UT) capsule (Start on 9/29/2024)     Other Visit Diagnoses       Well adult exam    -  Primary          Patient is a well 55 y/o  male    Cardiology Dr. Jessica - [facility]    Urology Dr. Lujan- Yearly prostate exams and hematuria follow up.  Has couple small kidney stones.      GI: [place] - Functional dyspepsia.  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  Just following    Vitamin D def - Taking 50,000 international units  weekly    Prediabetes - He will be doing weight loss and exercise

## 2024-12-10 ENCOUNTER — HOSPITAL ENCOUNTER (EMERGENCY)
Facility: HOSPITAL | Age: 54
Discharge: HOME | End: 2024-12-10
Attending: STUDENT IN AN ORGANIZED HEALTH CARE EDUCATION/TRAINING PROGRAM
Payer: MEDICAID

## 2024-12-10 ENCOUNTER — APPOINTMENT (OUTPATIENT)
Dept: CARDIOLOGY | Facility: HOSPITAL | Age: 54
End: 2024-12-10
Payer: MEDICAID

## 2024-12-10 VITALS
TEMPERATURE: 97.7 F | OXYGEN SATURATION: 95 % | HEART RATE: 71 BPM | BODY MASS INDEX: 32.21 KG/M2 | SYSTOLIC BLOOD PRESSURE: 132 MMHG | HEIGHT: 70 IN | RESPIRATION RATE: 14 BRPM | WEIGHT: 225 LBS | DIASTOLIC BLOOD PRESSURE: 82 MMHG

## 2024-12-10 DIAGNOSIS — R07.9 CHEST PAIN, UNSPECIFIED TYPE: Primary | ICD-10-CM

## 2024-12-10 LAB
ALBUMIN SERPL BCP-MCNC: 3.9 G/DL (ref 3.4–5)
ALP SERPL-CCNC: 52 U/L (ref 33–120)
ALT SERPL W P-5'-P-CCNC: 21 U/L (ref 10–52)
ANION GAP SERPL CALC-SCNC: 11 MMOL/L (ref 10–20)
APPEARANCE UR: CLEAR
AST SERPL W P-5'-P-CCNC: 18 U/L (ref 9–39)
BASOPHILS # BLD AUTO: 0.07 X10*3/UL (ref 0–0.1)
BASOPHILS NFR BLD AUTO: 1.1 %
BILIRUB SERPL-MCNC: 0.4 MG/DL (ref 0–1.2)
BILIRUB UR STRIP.AUTO-MCNC: NEGATIVE MG/DL
BUN SERPL-MCNC: 15 MG/DL (ref 6–23)
CALCIUM SERPL-MCNC: 9.1 MG/DL (ref 8.6–10.3)
CARDIAC TROPONIN I PNL SERPL HS: 3 NG/L (ref 0–20)
CHLORIDE SERPL-SCNC: 105 MMOL/L (ref 98–107)
CO2 SERPL-SCNC: 25 MMOL/L (ref 21–32)
COLOR UR: NORMAL
CREAT SERPL-MCNC: 0.85 MG/DL (ref 0.5–1.3)
EGFRCR SERPLBLD CKD-EPI 2021: >90 ML/MIN/1.73M*2
EOSINOPHIL # BLD AUTO: 0.15 X10*3/UL (ref 0–0.7)
EOSINOPHIL NFR BLD AUTO: 2.4 %
ERYTHROCYTE [DISTWIDTH] IN BLOOD BY AUTOMATED COUNT: 12.6 % (ref 11.5–14.5)
GLUCOSE SERPL-MCNC: 111 MG/DL (ref 74–99)
GLUCOSE UR STRIP.AUTO-MCNC: NORMAL MG/DL
HCT VFR BLD AUTO: 45.2 % (ref 41–52)
HGB BLD-MCNC: 15.4 G/DL (ref 13.5–17.5)
HOLD SPECIMEN: NORMAL
IMM GRANULOCYTES # BLD AUTO: 0.03 X10*3/UL (ref 0–0.7)
IMM GRANULOCYTES NFR BLD AUTO: 0.5 % (ref 0–0.9)
KETONES UR STRIP.AUTO-MCNC: NEGATIVE MG/DL
LACTATE SERPL-SCNC: 0.9 MMOL/L (ref 0.4–2)
LEUKOCYTE ESTERASE UR QL STRIP.AUTO: NEGATIVE
LIPASE SERPL-CCNC: 35 U/L (ref 9–82)
LYMPHOCYTES # BLD AUTO: 2.46 X10*3/UL (ref 1.2–4.8)
LYMPHOCYTES NFR BLD AUTO: 39.2 %
MCH RBC QN AUTO: 29.7 PG (ref 26–34)
MCHC RBC AUTO-ENTMCNC: 34.1 G/DL (ref 32–36)
MCV RBC AUTO: 87 FL (ref 80–100)
MONOCYTES # BLD AUTO: 0.63 X10*3/UL (ref 0.1–1)
MONOCYTES NFR BLD AUTO: 10 %
NEUTROPHILS # BLD AUTO: 2.94 X10*3/UL (ref 1.2–7.7)
NEUTROPHILS NFR BLD AUTO: 46.8 %
NITRITE UR QL STRIP.AUTO: NEGATIVE
NRBC BLD-RTO: 0 /100 WBCS (ref 0–0)
PH UR STRIP.AUTO: 6 [PH]
PLATELET # BLD AUTO: 369 X10*3/UL (ref 150–450)
POTASSIUM SERPL-SCNC: 3.7 MMOL/L (ref 3.5–5.3)
PROT SERPL-MCNC: 6.5 G/DL (ref 6.4–8.2)
PROT UR STRIP.AUTO-MCNC: NEGATIVE MG/DL
RBC # BLD AUTO: 5.18 X10*6/UL (ref 4.5–5.9)
RBC # UR STRIP.AUTO: NEGATIVE /UL
SODIUM SERPL-SCNC: 137 MMOL/L (ref 136–145)
SP GR UR STRIP.AUTO: 1.02
UROBILINOGEN UR STRIP.AUTO-MCNC: NORMAL MG/DL
WBC # BLD AUTO: 6.3 X10*3/UL (ref 4.4–11.3)

## 2024-12-10 PROCEDURE — 93005 ELECTROCARDIOGRAM TRACING: CPT

## 2024-12-10 PROCEDURE — 81003 URINALYSIS AUTO W/O SCOPE: CPT | Performed by: STUDENT IN AN ORGANIZED HEALTH CARE EDUCATION/TRAINING PROGRAM

## 2024-12-10 PROCEDURE — 84484 ASSAY OF TROPONIN QUANT: CPT | Performed by: STUDENT IN AN ORGANIZED HEALTH CARE EDUCATION/TRAINING PROGRAM

## 2024-12-10 PROCEDURE — 2500000001 HC RX 250 WO HCPCS SELF ADMINISTERED DRUGS (ALT 637 FOR MEDICARE OP): Performed by: STUDENT IN AN ORGANIZED HEALTH CARE EDUCATION/TRAINING PROGRAM

## 2024-12-10 PROCEDURE — 83690 ASSAY OF LIPASE: CPT | Performed by: STUDENT IN AN ORGANIZED HEALTH CARE EDUCATION/TRAINING PROGRAM

## 2024-12-10 PROCEDURE — 36415 COLL VENOUS BLD VENIPUNCTURE: CPT | Performed by: STUDENT IN AN ORGANIZED HEALTH CARE EDUCATION/TRAINING PROGRAM

## 2024-12-10 PROCEDURE — 99284 EMERGENCY DEPT VISIT MOD MDM: CPT | Performed by: STUDENT IN AN ORGANIZED HEALTH CARE EDUCATION/TRAINING PROGRAM

## 2024-12-10 PROCEDURE — 80053 COMPREHEN METABOLIC PANEL: CPT | Performed by: STUDENT IN AN ORGANIZED HEALTH CARE EDUCATION/TRAINING PROGRAM

## 2024-12-10 PROCEDURE — 83605 ASSAY OF LACTIC ACID: CPT | Performed by: STUDENT IN AN ORGANIZED HEALTH CARE EDUCATION/TRAINING PROGRAM

## 2024-12-10 PROCEDURE — 2500000005 HC RX 250 GENERAL PHARMACY W/O HCPCS: Performed by: STUDENT IN AN ORGANIZED HEALTH CARE EDUCATION/TRAINING PROGRAM

## 2024-12-10 PROCEDURE — 85025 COMPLETE CBC W/AUTO DIFF WBC: CPT | Performed by: STUDENT IN AN ORGANIZED HEALTH CARE EDUCATION/TRAINING PROGRAM

## 2024-12-10 RX ORDER — LIDOCAINE HYDROCHLORIDE 20 MG/ML
15 SOLUTION OROPHARYNGEAL ONCE
Status: COMPLETED | OUTPATIENT
Start: 2024-12-10 | End: 2024-12-10

## 2024-12-10 RX ORDER — ALUMINUM HYDROXIDE, MAGNESIUM HYDROXIDE, AND SIMETHICONE 1200; 120; 1200 MG/30ML; MG/30ML; MG/30ML
30 SUSPENSION ORAL ONCE
Status: COMPLETED | OUTPATIENT
Start: 2024-12-10 | End: 2024-12-10

## 2024-12-10 ASSESSMENT — PAIN SCALES - GENERAL
PAINLEVEL_OUTOF10: 4
PAINLEVEL_OUTOF10: 1
PAINLEVEL_OUTOF10: 5 - MODERATE PAIN
PAINLEVEL_OUTOF10: 4

## 2024-12-10 ASSESSMENT — LIFESTYLE VARIABLES
HAVE PEOPLE ANNOYED YOU BY CRITICIZING YOUR DRINKING: NO
EVER HAD A DRINK FIRST THING IN THE MORNING TO STEADY YOUR NERVES TO GET RID OF A HANGOVER: NO
TOTAL SCORE: 0
EVER FELT BAD OR GUILTY ABOUT YOUR DRINKING: NO
HAVE YOU EVER FELT YOU SHOULD CUT DOWN ON YOUR DRINKING: NO

## 2024-12-10 ASSESSMENT — PAIN - FUNCTIONAL ASSESSMENT
PAIN_FUNCTIONAL_ASSESSMENT: 0-10
PAIN_FUNCTIONAL_ASSESSMENT: 0-10

## 2024-12-10 ASSESSMENT — PAIN DESCRIPTION - LOCATION: LOCATION: CHEST

## 2024-12-10 NOTE — ED TRIAGE NOTES
Patient presents to the ED with c/o chest 'discomfort' and abdominal pain all day that has been off and on.  He has a hx of Afibb and wanted to make sure this is not his heart.  He is currently on Cipro for a urinary issue currently.

## 2024-12-10 NOTE — ED PROVIDER NOTES
HPI   Chief Complaint   Patient presents with    Abdominal Pain    Chest Pain       This is a 54-year-old male past medical history of Hashimoto, paroxysmal A-fib on metoprolol who presents to the emergency department for chest pain and epigastric pain that went to his back.  Patient said his pain started Monday morning around 9 AM and was intermittent throughout the day.  Currently he states is not really having pain.  He denies any nausea, vomiting no shortness of breath no swelling of his legs no issues or concerns otherwise.  He did take omeprazole and he also took Tums which she thinks may have improved his symptoms.                          Jessica Coma Scale Score: 15                  Patient History   Past Medical History:   Diagnosis Date    Dizziness and giddiness 07/21/2022    Lightheadedness    Personal history of other diseases of the circulatory system     History of atrial fibrillation    Personal history of other endocrine, nutritional and metabolic disease     History of Hashimoto thyroiditis     Past Surgical History:   Procedure Laterality Date    CT ANGIO CORONARY ART WITH HEARTFLOW IF SCORE >30%  1/31/2022    CT HEART CORONARY ANGIOGRAM 1/31/2022 [facility]    CT ANGIO CORONARY ART WITH HEARTFLOW IF SCORE >30%  2/24/2023    CT HEART CORONARY ANGIOGRAM [facility] CT     Family History   Problem Relation Name Age of Onset    Kidney failure Mother      Diabetes type II Father      Nephrolithiasis Father      Sleep apnea Brother      Diabetes Paternal Grandmother      Other (bilateral renal calculus) Paternal Grandmother       Social History     Tobacco Use    Smoking status: Never    Smokeless tobacco: Never   Substance Use Topics    Alcohol use: Not Currently    Drug use: Never       Physical Exam   ED Triage Vitals [12/10/24 0025]   Temperature Heart Rate Respirations BP   36.5 °C (97.7 °F) 86 16 (!) 145/106      Pulse Ox Temp src Heart Rate Source Patient Position   96 % -- -- --      BP  Location FiO2 (%)     -- --       Physical Exam  Constitutional:       General: He is not in acute distress.  HENT:      Head: Normocephalic and atraumatic.      Right Ear: Tympanic membrane normal.      Left Ear: Tympanic membrane normal.      Mouth/Throat:      Mouth: Mucous membranes are moist.   Eyes:      Extraocular Movements: Extraocular movements intact.      Conjunctiva/sclera: Conjunctivae normal.      Pupils: Pupils are equal, round, and reactive to light.   Cardiovascular:      Rate and Rhythm: Normal rate and regular rhythm.      Heart sounds: No murmur heard.  Pulmonary:      Effort: Pulmonary effort is normal. No respiratory distress.      Breath sounds: Normal breath sounds. No stridor. No wheezing or rales.   Abdominal:      General: Bowel sounds are normal. There is no distension.      Tenderness: There is no abdominal tenderness. There is no guarding or rebound.   Musculoskeletal:         General: No swelling, tenderness or deformity. Normal range of motion.   Skin:     General: Skin is warm and dry.      Coloration: Skin is not jaundiced.      Findings: No bruising or lesion.   Neurological:      General: No focal deficit present.      Mental Status: He is alert and oriented to person, place, and time. Mental status is at baseline.      Cranial Nerves: No cranial nerve deficit.      Motor: No weakness.   Psychiatric:         Mood and Affect: Mood normal.       Labs Reviewed   URINALYSIS WITH REFLEX CULTURE AND MICROSCOPIC - Normal       Result Value    Color, Urine Light-Yellow      Appearance, Urine Clear      Specific Gravity, Urine 1.017      pH, Urine 6.0      Protein, Urine NEGATIVE      Glucose, Urine Normal      Blood, Urine NEGATIVE      Ketones, Urine NEGATIVE      Bilirubin, Urine NEGATIVE      Urobilinogen, Urine Normal      Nitrite, Urine NEGATIVE      Leukocyte Esterase, Urine NEGATIVE     CBC WITH AUTO DIFFERENTIAL    WBC 6.3      nRBC 0.0      RBC 5.18      Hemoglobin 15.4       Hematocrit 45.2      MCV 87      MCH 29.7      MCHC 34.1      RDW 12.6      Platelets 369      Neutrophils % 46.8      Immature Granulocytes %, Automated 0.5      Lymphocytes % 39.2      Monocytes % 10.0      Eosinophils % 2.4      Basophils % 1.1      Neutrophils Absolute 2.94      Immature Granulocytes Absolute, Automated 0.03      Lymphocytes Absolute 2.46      Monocytes Absolute 0.63      Eosinophils Absolute 0.15      Basophils Absolute 0.07     COMPREHENSIVE METABOLIC PANEL   URINALYSIS WITH REFLEX CULTURE AND MICROSCOPIC    Narrative:     The following orders were created for panel order Urinalysis with Reflex Culture and Microscopic.  Procedure                               Abnormality         Status                     ---------                               -----------         ------                     Urinalysis with Reflex C...[[id number]]  Normal              Final result               Extra Urine Gray Tube[[id number]]                            In process                   Please view results for these tests on the individual orders.   TROPONIN SERIES- (INITIAL, 1 HR)    Narrative:     The following orders were created for panel order Troponin Series, (0, 1 HR).  Procedure                               Abnormality         Status                     ---------                               -----------         ------                     Troponin I, High Sensiti...[[id number]]                                                 Troponin, High Sensitivi...[[id number]]                                                   Please view results for these tests on the individual orders.   EXTRA URINE GRAY TUBE   SERIAL TROPONIN-INITIAL   SERIAL TROPONIN, 1 HOUR   LACTATE     No orders to display       ED Course & MDM   ED Course as of 12/10/24 0224   Tue Dec 10, 2024   0120 EKG on my read shows sinus rhythm at a rate 89 bpm no ST elevations, normal intervals. [CF]      ED Course User Index  [CF] Ambar Sanabria MD        Medical Decision Making  This is a 54-year-old male who presents emergency department for epigastric pain and chest pain that started yesterday at 10 AM.  He reports his pain is minimal now.  He is EKG is nonischemic.  He was given GI cocktail and reports that he is no longer having any symptoms.  His labs show his troponin is only 3 given the fact that he has been going on intermittently all day I would expect this to be elevated if there was any cardiac injury.  He has no leukocytosis or left shift.  His lungs were clear on my exam.  Less likely intra-abdominal pathology.  I do not believe that this is ACS at this time.  I did offer patient a stress test but he does have follow-up with his cardiologist after [place] and feels comfortable waiting until then to speak with his cardiologist which I think is reasonable.  He verbalized his return precautions at this time he is safe for discharge home.        Procedure  Procedures     Ambar Sanabria MD  12/10/24 4940

## 2024-12-15 LAB
ATRIAL RATE: 89 BPM
P AXIS: 39 DEGREES
PR INTERVAL: 142 MS
Q ONSET: 252 MS
QRS COUNT: 14 BEATS
QRS DURATION: 87 MS
QT INTERVAL: 368 MS
QTC CALCULATION(BAZETT): 448 MS
QTC FREDERICIA: 419 MS
R AXIS: -8 DEGREES
T AXIS: 57 DEGREES
T OFFSET: 436 MS
VENTRICULAR RATE: 89 BPM

## 2024-12-30 PROBLEM — E06.3 HASHIMOTO THYROIDITIS: Status: ACTIVE | Noted: 2018-05-24

## 2024-12-30 RX ORDER — CIPROFLOXACIN 500 MG/1
500 TABLET ORAL 2 TIMES DAILY
COMMUNITY
Start: 2024-12-06 | End: 2025-01-03 | Stop reason: ALTCHOICE

## 2024-12-30 RX ORDER — PERMETHRIN 50 MG/G
5 CREAM TOPICAL ONCE
COMMUNITY
Start: 2024-10-06 | End: 2025-01-03 | Stop reason: ALTCHOICE

## 2024-12-30 RX ORDER — CYCLOBENZAPRINE HCL 10 MG
1 TABLET ORAL
COMMUNITY
Start: 2024-11-12 | End: 2025-01-03 | Stop reason: ALTCHOICE

## 2025-01-03 ENCOUNTER — APPOINTMENT (OUTPATIENT)
Dept: CARDIOLOGY | Facility: CLINIC | Age: 55
End: 2025-01-03
Payer: MEDICAID

## 2025-01-03 VITALS
HEART RATE: 82 BPM | DIASTOLIC BLOOD PRESSURE: 80 MMHG | SYSTOLIC BLOOD PRESSURE: 120 MMHG | OXYGEN SATURATION: 98 % | WEIGHT: 229.8 LBS | HEIGHT: 70 IN | BODY MASS INDEX: 32.9 KG/M2

## 2025-01-03 DIAGNOSIS — R07.89 CHEST PRESSURE: Primary | ICD-10-CM

## 2025-01-03 DIAGNOSIS — I48.91 ATRIAL FIBRILLATION (MULTI): ICD-10-CM

## 2025-01-03 LAB
ATRIAL RATE: 82 BPM
P AXIS: 53 DEGREES
P OFFSET: 197 MS
P ONSET: 143 MS
PR INTERVAL: 136 MS
Q ONSET: 211 MS
QRS COUNT: 13 BEATS
QRS DURATION: 82 MS
QT INTERVAL: 360 MS
QTC CALCULATION(BAZETT): 420 MS
QTC FREDERICIA: 399 MS
R AXIS: 19 DEGREES
T AXIS: 58 DEGREES
T OFFSET: 391 MS
VENTRICULAR RATE: 82 BPM

## 2025-01-03 PROCEDURE — 93010 ELECTROCARDIOGRAM REPORT: CPT | Performed by: INTERNAL MEDICINE

## 2025-01-03 PROCEDURE — 3008F BODY MASS INDEX DOCD: CPT | Performed by: INTERNAL MEDICINE

## 2025-01-03 PROCEDURE — 99213 OFFICE O/P EST LOW 20 MIN: CPT | Mod: 25 | Performed by: INTERNAL MEDICINE

## 2025-01-03 PROCEDURE — 99213 OFFICE O/P EST LOW 20 MIN: CPT | Performed by: INTERNAL MEDICINE

## 2025-01-03 PROCEDURE — 93005 ELECTROCARDIOGRAM TRACING: CPT | Performed by: INTERNAL MEDICINE

## 2025-01-03 RX ORDER — ROSUVASTATIN CALCIUM 5 MG/1
5 TABLET, COATED ORAL DAILY
Qty: 90 TABLET | Refills: 3 | Status: SHIPPED | OUTPATIENT
Start: 2025-01-03 | End: 2026-01-03

## 2025-01-03 ASSESSMENT — ENCOUNTER SYMPTOMS: PALPITATIONS: 1

## 2025-01-03 NOTE — PATIENT INSTRUCTIONS
For your cholesterol, Dr. Jessica has prescribed rosuvastatin 5 mg daily. A prescription has been sent to your pharmacy.    Continue all other medications as prescribed.   Dr. Jessica has recommended a [employer] Mobile device which is a mobile EKG device that pairs with your Smart Phone and measures your heart rhythm and rate.  Please have repeat blood work drawn in 3 months to followup on your cholesterol. You will be notified of the results once they become available.    Repeat echocardiogram (ultrasound of the heart) in 1 year to followup on your heart function and structure.   Followup with Dr. Jessica in 1 year, sooner should any issues or concerns arise before then.     If you have any questions or cardiac concerns, please call our office at [phone].

## 2025-01-03 NOTE — PROGRESS NOTES
"Counseling:  The patient was counseled regarding diagnostic results, instructions for management, risk factor reductions, prognosis, patient and family education, impressions, risks and benefits of treatment options and importance of compliance with treatment.      Chief Complaint:   The patient presents today for annual followup of angina and a-fib.     History Of Present Illness:    Julian Juarez is a 54 year old male patient who presents today for annual followup of angina and a-fib. His PMH is significant for anxiety, GERD, Hashimoto's thyroiditis, mild SHERMAN, palpitations, and paroxysmal a-fib. Over the past year, the patient states that he has done well from a cardiac standpoint. He reports periodic chest discomfort, noting that he also has GI issues which makes it hard to differentiate. He denies any exertional chest pain. The patient also denies any SOB either at rest or with exertion. He reports rare palpitations, although he does report having a recent episode that was quite significant. BP has been stable. The patient is compliant with his prescribed medications.     Last Recorded Vitals:  Vitals:    01/03/25 1030   BP: 120/80   BP Location: Left arm   Patient Position: Sitting   BP Cuff Size: Adult   Pulse: 82   SpO2: 98%   Weight: 104 kg (229 lb 12.8 oz)   Height: 1.778 m (5' 10\")       Past Surgical History:  He has a past surgical history that includes CT angio coronary art with heartflow if score >30% (1/31/2022) and CT angio coronary art with heartflow if score >30% (2/24/2023).      Social History:  He reports that he has never smoked. He has never used smokeless tobacco. He reports that he does not currently use alcohol. He reports that he does not use drugs.    Family History:  Family History   Problem Relation Name Age of Onset    Kidney failure Mother      Diabetes type II Father      Nephrolithiasis Father      Sleep apnea Brother      Diabetes Paternal Grandmother      Other (bilateral renal " calculus) Paternal Grandmother          Allergies:  Patient has no known allergies.    Outpatient Medications:  Current Outpatient Medications   Medication Instructions    cetirizine (ZYRTEC) 10 mg, oral, Daily    ciprofloxacin (CIPRO) 500 mg, 2 times daily    cyclobenzaprine (Flexeril) 10 mg tablet 1 tablet, 3 times daily (0900,1400,1900)    ergocalciferol (VITAMIN D-2) 1,250 mcg, oral, Once Weekly    fluticasone (Flonase) 50 mcg/actuation nasal spray     levothyroxine (SYNTHROID, LEVOXYL) 112 mcg, oral, Daily    metoprolol tartrate (LOPRESSOR) 50 mg, oral, 2 times daily    omeprazole (PRILOSEC) 20 mg, oral, Daily before breakfast, Do not crush or chew.    permethrin (Elimite) 5 % cream 5 Applications, Once    triamcinolone (Kenalog) 0.1 % cream Thin coat twice daily to affected areas on  for 3-4 weeks, then weekends only. Repeat every few months for flares.     Review of Systems   Cardiovascular:  Positive for palpitations.        Occasional chest discomfort   All other systems reviewed and are negative.     Physical Exam:  Constitutional:       Appearance: Healthy appearance. Not in distress.   Neck:      Vascular: No JVR. JVD normal.   Pulmonary:      Effort: Pulmonary effort is normal.      Breath sounds: Normal breath sounds. No wheezing. No rhonchi. No rales.   Chest:      Chest wall: Not tender to palpatation.   Cardiovascular:      PMI at left midclavicular line. Normal rate. Regular rhythm. Normal S1. Normal S2.       Murmurs: There is no murmur.      No gallop.  No click. No rub.   Pulses:     Intact distal pulses.   Edema:     Peripheral edema absent.   Abdominal:      General: Bowel sounds are normal.      Palpations: Abdomen is soft.      Tenderness: There is no abdominal tenderness.   Musculoskeletal: Normal range of motion.         General: No tenderness. Skin:     General: Skin is warm and dry.   Neurological:      General: No focal deficit present.      Mental Status: Alert and oriented to person,  place and time.          Last Labs:  CBC -  Lab Results   Component Value Date    WBC 6.3 12/10/2024    HGB 15.4 12/10/2024    HCT 45.2 12/10/2024    MCV 87 12/10/2024     12/10/2024       CMP -  Lab Results   Component Value Date    CALCIUM 9.1 12/10/2024    PROT 6.5 12/10/2024    ALBUMIN 3.9 12/10/2024    AST 18 12/10/2024    ALT 21 12/10/2024    ALKPHOS 52 12/10/2024    BILITOT 0.4 12/10/2024       LIPID PANEL -   Lab Results   Component Value Date    CHOL 165 09/23/2024    TRIG 82 09/23/2024    HDL 61.7 09/23/2024    CHHDL 2.7 09/23/2024    LDLF 113 (H) 09/27/2023    VLDL 16 09/23/2024    NHDL 103 09/23/2024       RENAL FUNCTION PANEL -   Lab Results   Component Value Date    GLUCOSE 111 (H) 12/10/2024     12/10/2024    K 3.7 12/10/2024     12/10/2024    CO2 25 12/10/2024    ANIONGAP 11 12/10/2024    BUN 15 12/10/2024    CREATININE 0.85 12/10/2024    GFRMALE >90 09/27/2023    CALCIUM 9.1 12/10/2024    ALBUMIN 3.9 12/10/2024        Lab Results   Component Value Date    BNP 14 02/26/2023    HGBA1C 5.7 05/31/2023       Last Cardiology Tests:  12/15/2023 - TTE  1. Left ventricular systolic function is normal with a 55% estimated ejection fraction.  2. Spectral Doppler shows an impaired relaxation pattern of left ventricular diastolic filling.    10/26/2023 to 11/09/2023 - Holter Monitor  1. Predominant underlying rhythm was sinus rhythm; min HR 44 bpm, max  bpm, avg HR 81 bpm.  2. 1 run of supraventricular tachycardia occurred; 4 beats, max rate 193 bpm.    02/23/2023 - CTA Coronary Arteries  Mild luminal irregularities in the coronary arteries as detailed above without significant luminal stenosis.    05/25/2022 to 06/10/2022 - Event Monitoring  1. Baseline rhythm was sinus with average HR 82 bpm.  2. Rare PACs/PVCs.  3. One atrial run consisting of 5 beats.  4. There was one episode of narrow complex rhythm  bpm with slight rate variation - favored to be sinus tachycardia, however  "supraventricular tachycardia not entirely excluded. Episode was asymptomatic.  5. Patient symptoms correlated with mostly sinus rhythm, rarely sinus bradycardia HR 54, or sinus tachycardia HR up to 123 bpm.     03/29/2022 to 03/30/2022 - Event Monitoring  Primary rhythm was sinus rhythm; avg HR 91 bpm, min HR 72 bpm, max  bpm.     02/02/2022 - TTE  The left ventricular systolic function is normal with a 55% estimated ejection fraction.     01/31/2022 - CTA Coronary Arteries  Normal coronary anatomy without evidence of atherosclerotic changes or stenotic disease.     10/07/2021 to 11/27/2021 - Event Monitoring  1. Baseline transmission showing sinus rhythm with a heart rate of 75 bpm.   2. PAC burden of less than 1%.   3. PVC burden of 1%.  4. 39 patient triggered events with reported skipped heartbeat, lightheadedness, or \"other\" correlating with sinus rhythm, sinus rhythm with PACs/PVCs, sinus tachycardia, sinus tachycardia with PACs/PVCs, or PSVT.      06/03/2021 - CT Cardiac Scoring  1. Total 2.  2. The visualized ascending thoracic aorta measures 3.4 cm in diameter.  3. The heart is normal in size. No pericardial effusion is present.  4. No gross evidence of mediastinal or hilar lymphadenopathy or masses is identified. The visualized segments of the lungs are normally expanded.  5. The main pulmonary artery, right and left pulmonary artery are normal in size.  6. The visualized subdiaphragmatic structures appear intact.     09/16/2020 to 10/15/2020 - Event Monitoring  1. 53 events were transmitted; 33 symptomatic, 20 device triggered.  2. SVT occurred 1 time with fastest run 138 bpm.  3. PAC burden of <1%.  4. PVC burden of 1%.     Lab review: I have personally reviewed the laboratory result(s).    Assessment/Plan   1) Chest Pain Suggestive of Angina  Stress echo in past negative   CTA coronary arteries 02/23/2023 with mild luminal irregularities without significant luminal stenosis  TTE 12/15/2023 with " LVEF 55%  Lipid panel 09/23/2024 with LDL of 87  Reports periodic chest discomfort - also has h/o GI issues   Denies exertional chest pain  Advised on aggressive secondary risk factor modification to achieve LDL goal of <70   Start rosuvastatin 5 mg daily   Repeat lipid panel 3 months  Repeat echo 1 year   F/U 1 year     2) Exertional SOB  Patient contacted our office 11/20/2023 to report SOB after quickly ascending 5 flights of stairs - advised to monitor symptoms and to call back with persistent or worsening symptoms  Patient contacted our office 12/06/2023 to report persistent exertional SOB - CXR and TTE ordered  CXR 12/08/2023 unremarkable  TTE 12/15/2023 with LVEF 55%   Advised to increase physical activity and follow a Mediterranean style diet to promote weight loss  Denies SOB currently   F/U 1 year     3) Atrial Fibrillation   On metoprolol tartrate 50 mg BID  Patient contacted Dr. Miller's office 10/24/2023 to report ?a-fib at night during sleep - Holter ordered  Holter with 1 run of SVT, PACs, somewhat elevated HR (sinus tachycardia), no AF detected   Last seen by EP 07/08/2024 - very low burden PAC's/PVCs, BB increased, advised against escalating medical therapy, recommended a trial of flecainide if symptoms were to worsen (but should avoid AADs).  Reports rare palpitations  Reports recent episode of a palpitations that was quite predominant   Recommend [employer] Mobile to capture episodes of palpitations   Continue current medical Rx   F/U 1 year       Scribe Attestation  By signing my name below, I, Aleksandra Dixon   attest that this documentation has been prepared under the direction and in the presence of Byrson Jessica MD.

## 2025-01-20 ENCOUNTER — TELEPHONE (OUTPATIENT)
Dept: PRIMARY CARE | Facility: CLINIC | Age: 55
End: 2025-01-20
Payer: MEDICAID

## 2025-01-20 NOTE — TELEPHONE ENCOUNTER
Patient was seen at an urgent care 1.13.2025 for nasal congestion and cough. Patient called the office today because when he takes a deep breath he feels he can hear something. Patient stated he is feeling better but wasn't sure if this is something to be concerned about. I did offer him an appt with Dr. Casillas, per Dr. Varghese this would be okay-patient didn't feel an appt is needed at this time, he is feeling better and will continue to monitor his symptoms. He was advised if anything changes to call the office and we'll get him scheduled.

## 2025-01-30 ENCOUNTER — APPOINTMENT (OUTPATIENT)
Dept: SLEEP MEDICINE | Facility: CLINIC | Age: 55
End: 2025-01-30
Payer: MEDICAID

## 2025-02-19 DIAGNOSIS — E55.9 VITAMIN D DEFICIENCY: ICD-10-CM

## 2025-02-19 RX ORDER — ERGOCALCIFEROL 1.25 MG/1
1 CAPSULE ORAL
Qty: 12 CAPSULE | Refills: 0 | Status: SHIPPED | OUTPATIENT
Start: 2025-02-23

## 2025-03-15 LAB
1,25(OH)2D SERPL-MCNC: 20 PG/ML (ref 18–72)
1,25(OH)2D2 SERPL-MCNC: <8 PG/ML
1,25(OH)2D3 SERPL-MCNC: 20 PG/ML
ALBUMIN SERPL-MCNC: 4.3 G/DL (ref 3.6–5.1)
ALP SERPL-CCNC: 58 U/L (ref 35–144)
ALT SERPL-CCNC: 28 U/L (ref 9–46)
ANION GAP SERPL CALCULATED.4IONS-SCNC: 8 MMOL/L (CALC) (ref 7–17)
AST SERPL-CCNC: 22 U/L (ref 10–35)
BASOPHILS # BLD AUTO: 48 CELLS/UL (ref 0–200)
BASOPHILS NFR BLD AUTO: 0.9 %
BILIRUB SERPL-MCNC: 0.8 MG/DL (ref 0.2–1.2)
BUN SERPL-MCNC: 15 MG/DL (ref 7–25)
CALCIUM SERPL-MCNC: 9.2 MG/DL (ref 8.6–10.3)
CHLORIDE SERPL-SCNC: 104 MMOL/L (ref 98–110)
CHOLEST SERPL-MCNC: 197 MG/DL
CHOLEST/HDLC SERPL: 2.8 (CALC)
CO2 SERPL-SCNC: 25 MMOL/L (ref 20–32)
CREAT SERPL-MCNC: 0.79 MG/DL (ref 0.7–1.3)
EGFRCR SERPLBLD CKD-EPI 2021: 106 ML/MIN/1.73M2
EOSINOPHIL # BLD AUTO: 133 CELLS/UL (ref 15–500)
EOSINOPHIL NFR BLD AUTO: 2.5 %
ERYTHROCYTE [DISTWIDTH] IN BLOOD BY AUTOMATED COUNT: 12.6 % (ref 11–15)
EST. AVERAGE GLUCOSE BLD GHB EST-MCNC: 131 MG/DL
EST. AVERAGE GLUCOSE BLD GHB EST-SCNC: 7.3 MMOL/L
GLUCOSE SERPL-MCNC: 114 MG/DL (ref 65–99)
HBA1C MFR BLD: 6.2 % OF TOTAL HGB
HCT VFR BLD AUTO: 46.3 % (ref 38.5–50)
HDLC SERPL-MCNC: 70 MG/DL
HGB BLD-MCNC: 15.7 G/DL (ref 13.2–17.1)
LDLC SERPL CALC-MCNC: 108 MG/DL (CALC)
LYMPHOCYTES # BLD AUTO: 1802 CELLS/UL (ref 850–3900)
LYMPHOCYTES NFR BLD AUTO: 34 %
MCH RBC QN AUTO: 30.7 PG (ref 27–33)
MCHC RBC AUTO-ENTMCNC: 33.9 G/DL (ref 32–36)
MCV RBC AUTO: 90.4 FL (ref 80–100)
MONOCYTES # BLD AUTO: 461 CELLS/UL (ref 200–950)
MONOCYTES NFR BLD AUTO: 8.7 %
NEUTROPHILS # BLD AUTO: 2857 CELLS/UL (ref 1500–7800)
NEUTROPHILS NFR BLD AUTO: 53.9 %
NONHDLC SERPL-MCNC: 127 MG/DL (CALC)
PLATELET # BLD AUTO: 378 THOUSAND/UL (ref 140–400)
PMV BLD REES-ECKER: 9.5 FL (ref 7.5–12.5)
POTASSIUM SERPL-SCNC: 4 MMOL/L (ref 3.5–5.3)
PROT SERPL-MCNC: 6.8 G/DL (ref 6.1–8.1)
RBC # BLD AUTO: 5.12 MILLION/UL (ref 4.2–5.8)
SODIUM SERPL-SCNC: 137 MMOL/L (ref 135–146)
T3FREE SERPL-MCNC: 3.6 PG/ML (ref 2.3–4.2)
TRIGL SERPL-MCNC: 92 MG/DL
TSH SERPL-ACNC: 2.55 MIU/L (ref 0.4–4.5)
VIT B12 SERPL-MCNC: 377 PG/ML (ref 200–1100)
WBC # BLD AUTO: 5.3 THOUSAND/UL (ref 3.8–10.8)

## 2025-04-01 ENCOUNTER — APPOINTMENT (OUTPATIENT)
Dept: PRIMARY CARE | Facility: CLINIC | Age: 55
End: 2025-04-01
Payer: MEDICAID

## 2025-04-01 ENCOUNTER — HOSPITAL ENCOUNTER (OUTPATIENT)
Dept: RADIOLOGY | Facility: HOSPITAL | Age: 55
Discharge: HOME | End: 2025-04-01
Payer: MEDICAID

## 2025-04-01 VITALS
DIASTOLIC BLOOD PRESSURE: 72 MMHG | BODY MASS INDEX: 32.78 KG/M2 | OXYGEN SATURATION: 97 % | HEIGHT: 70 IN | HEART RATE: 79 BPM | SYSTOLIC BLOOD PRESSURE: 108 MMHG | WEIGHT: 229 LBS | TEMPERATURE: 97.4 F

## 2025-04-01 DIAGNOSIS — M99.06 SEGMENTAL AND SOMATIC DYSFUNCTION OF LOWER EXTREMITY: ICD-10-CM

## 2025-04-01 DIAGNOSIS — M79.672 LEFT FOOT PAIN: ICD-10-CM

## 2025-04-01 DIAGNOSIS — M79.672 LEFT FOOT PAIN: Primary | ICD-10-CM

## 2025-04-01 PROCEDURE — 99213 OFFICE O/P EST LOW 20 MIN: CPT | Performed by: FAMILY MEDICINE

## 2025-04-01 PROCEDURE — 1036F TOBACCO NON-USER: CPT | Performed by: FAMILY MEDICINE

## 2025-04-01 PROCEDURE — 73630 X-RAY EXAM OF FOOT: CPT | Mod: LT

## 2025-04-01 PROCEDURE — 98925 OSTEOPATH MANJ 1-2 REGIONS: CPT | Performed by: FAMILY MEDICINE

## 2025-04-01 PROCEDURE — 73630 X-RAY EXAM OF FOOT: CPT | Mod: LEFT SIDE | Performed by: RADIOLOGY

## 2025-04-01 PROCEDURE — 3008F BODY MASS INDEX DOCD: CPT | Performed by: FAMILY MEDICINE

## 2025-04-01 RX ORDER — NAPROXEN 500 MG/1
500 TABLET ORAL 2 TIMES DAILY PRN
Qty: 28 TABLET | Refills: 0 | Status: SHIPPED | OUTPATIENT
Start: 2025-04-01 | End: 2025-04-15

## 2025-04-01 NOTE — PROGRESS NOTES
Subjective   Patient ID: Julian Juarez is a 55 y.o. male who presents for well exam  HPI    Concerns today:    L foot swelling. Has been told it was gout and/or infection. Last xray in 2023 with only L calcaneal spur. Pain on top of foot. Worse with walking and at night when sleeping.   Uric acid 5.9     Spot on R hand. Has had it for the last few weeks. Has improved in size, but continues to be itchy and red. No bleeding.     Discuss Crestor 5mg. Cardiologist recommended starting cholesterol medications.     In general the patient states that his health is:  Good.     Regular dental visits: Yes and getting crown  Vision problems: No changes in the vision  Hearing loss:  No changes    Diet:  He is trying to get back to a good diet.  He is not eating a lot of fast food or fried food.   Exercise:  Has been doing a little but plans on  getting back to the gym  Weight concerns:  yes  Sleep:  Yes  Caffeine:  None  Water: Water intake is good    Reproductive health:  Sexually active: yes  Urinary difficulties: none.  Sees urology Dr Lujan    Colon cancer screening:  Last done: 2023  Colonoscopy     10 year follow up    Review of Systems    Objective   Physical Exam  CN II general: Patient is alert and oriented ×3 and appears in no acute distress. No respiratory distress.    Head: Atraumatic normocephalic.    Eyes: EOMI, PERRLA      Ears: Canals patent without any irritation, tympanic membranes without inflammation, no swelling, normal light reflex.    Nose: Nares patent. Turbinates are not swollen. No discharge.    Mouth: Normal mucosa. Moist. No erythema, exudates, tonsillar enlargement.    Neck: Normal range of motion, no masses.  Thyroid is palpable and normal in size without any nodules. No anterior cervical or posterior cervical adenopathy.    Heart: Regular rate and rhythm, no murmurs clicks or gallops    Lungs: Clear to auscultation bilaterally without any rhonchi rales or wheezing, lung sounds heard throughout  all lung fields    Abdomen: Soft, nontender, no rigidity, rebound, guarding or organomegaly. Bowel sounds ×4 quadrants.    Musculoskeletal: Normal range of motion, strength is grossly intact in the proximal distal muscles of the upper and lower extremities bilaterally, deep tendon reflexes +2 out of 4 and symmetric bilaterally at the patella, Achilles, biceps, triceps, sensation intact. Pain to palpation of 3-4th metatarsal. No pain to palpation around ankle or lower shin.      Osteopathic left 2nd cuniforme resisted anterior motion    Nerves: Cranial nerves II through XII appear grossly intact and without deficit    Skin: Mild erythema, warmth and swelling to top of foot     Psych: Normal affect.     Assessment/Plan   Problem List Items Addressed This Visit       Foot pain - Primary    Relevant Medications    naproxen (Naprosyn) 500 mg tablet    Other Relevant Orders    XR foot left 3+ views     Other Visit Diagnoses       Segmental and somatic dysfunction of lower extremity              Patient is a well 53 y/o  male    Cardiology now Dr. Forbes with no changes, previously Dr. Jessica - [facility]    Urology Dr. Lujan- Yearly prostate exams and hematuria follow up.  Has couple small kidney stones.  Annual visits.     GI: [place] - Functional dyspepsia.  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  Just following    Vitamin D def - Taking 50,000 international units weekly, wnl    Prediabetes - He will be doing weight loss and exercise, 6.2 3/2025. Previously 6    Left foot pain  - Xray ordered  - Start Naproxen   - OMT used as HVLA in the left lower extremity

## 2025-04-02 ENCOUNTER — HOSPITAL ENCOUNTER (EMERGENCY)
Facility: HOSPITAL | Age: 55
Discharge: HOME | End: 2025-04-03
Payer: MEDICAID

## 2025-04-02 VITALS
TEMPERATURE: 98.6 F | RESPIRATION RATE: 20 BRPM | DIASTOLIC BLOOD PRESSURE: 89 MMHG | SYSTOLIC BLOOD PRESSURE: 131 MMHG | HEART RATE: 82 BPM | HEIGHT: 70 IN | OXYGEN SATURATION: 96 % | WEIGHT: 225 LBS | BODY MASS INDEX: 32.21 KG/M2

## 2025-04-02 DIAGNOSIS — L03.116 CELLULITIS OF LEFT LOWER EXTREMITY: ICD-10-CM

## 2025-04-02 DIAGNOSIS — M79.672 LEFT FOOT PAIN: Primary | ICD-10-CM

## 2025-04-02 PROCEDURE — 99284 EMERGENCY DEPT VISIT MOD MDM: CPT | Mod: 25

## 2025-04-02 ASSESSMENT — PAIN DESCRIPTION - PAIN TYPE: TYPE: ACUTE PAIN

## 2025-04-02 ASSESSMENT — PAIN DESCRIPTION - ORIENTATION: ORIENTATION: LEFT

## 2025-04-02 ASSESSMENT — PAIN SCALES - GENERAL: PAINLEVEL_OUTOF10: 7

## 2025-04-02 ASSESSMENT — PAIN DESCRIPTION - LOCATION: LOCATION: FOOT

## 2025-04-02 ASSESSMENT — PAIN DESCRIPTION - FREQUENCY: FREQUENCY: CONSTANT/CONTINUOUS

## 2025-04-02 ASSESSMENT — PAIN DESCRIPTION - DESCRIPTORS: DESCRIPTORS: BURNING

## 2025-04-02 ASSESSMENT — PAIN - FUNCTIONAL ASSESSMENT: PAIN_FUNCTIONAL_ASSESSMENT: 0-10

## 2025-04-03 LAB
ALBUMIN SERPL BCP-MCNC: 4 G/DL (ref 3.4–5)
ALP SERPL-CCNC: 55 U/L (ref 33–120)
ALT SERPL W P-5'-P-CCNC: 18 U/L (ref 10–52)
ANION GAP SERPL CALC-SCNC: 11 MMOL/L (ref 10–20)
AST SERPL W P-5'-P-CCNC: 18 U/L (ref 9–39)
BASOPHILS # BLD AUTO: 0.09 X10*3/UL (ref 0–0.1)
BASOPHILS NFR BLD AUTO: 1.5 %
BILIRUB SERPL-MCNC: 0.4 MG/DL (ref 0–1.2)
BUN SERPL-MCNC: 21 MG/DL (ref 6–23)
CALCIUM SERPL-MCNC: 9.4 MG/DL (ref 8.6–10.3)
CHLORIDE SERPL-SCNC: 107 MMOL/L (ref 98–107)
CO2 SERPL-SCNC: 26 MMOL/L (ref 21–32)
CREAT SERPL-MCNC: 0.96 MG/DL (ref 0.5–1.3)
CRP SERPL-MCNC: 0.21 MG/DL
EGFRCR SERPLBLD CKD-EPI 2021: >90 ML/MIN/1.73M*2
EOSINOPHIL # BLD AUTO: 0.14 X10*3/UL (ref 0–0.7)
EOSINOPHIL NFR BLD AUTO: 2.3 %
ERYTHROCYTE [DISTWIDTH] IN BLOOD BY AUTOMATED COUNT: 12.3 % (ref 11.5–14.5)
ERYTHROCYTE [SEDIMENTATION RATE] IN BLOOD BY WESTERGREN METHOD: 4 MM/H (ref 0–20)
GLUCOSE SERPL-MCNC: 112 MG/DL (ref 74–99)
HCT VFR BLD AUTO: 43.2 % (ref 41–52)
HGB BLD-MCNC: 14.9 G/DL (ref 13.5–17.5)
IMM GRANULOCYTES # BLD AUTO: 0.01 X10*3/UL (ref 0–0.7)
IMM GRANULOCYTES NFR BLD AUTO: 0.2 % (ref 0–0.9)
LYMPHOCYTES # BLD AUTO: 2.22 X10*3/UL (ref 1.2–4.8)
LYMPHOCYTES NFR BLD AUTO: 36.5 %
MCH RBC QN AUTO: 30 PG (ref 26–34)
MCHC RBC AUTO-ENTMCNC: 34.5 G/DL (ref 32–36)
MCV RBC AUTO: 87 FL (ref 80–100)
MONOCYTES # BLD AUTO: 0.45 X10*3/UL (ref 0.1–1)
MONOCYTES NFR BLD AUTO: 7.4 %
NEUTROPHILS # BLD AUTO: 3.18 X10*3/UL (ref 1.2–7.7)
NEUTROPHILS NFR BLD AUTO: 52.1 %
NRBC BLD-RTO: 0 /100 WBCS (ref 0–0)
PLATELET # BLD AUTO: 357 X10*3/UL (ref 150–450)
POTASSIUM SERPL-SCNC: 3.7 MMOL/L (ref 3.5–5.3)
PROT SERPL-MCNC: 6.7 G/DL (ref 6.4–8.2)
RBC # BLD AUTO: 4.97 X10*6/UL (ref 4.5–5.9)
SODIUM SERPL-SCNC: 140 MMOL/L (ref 136–145)
URATE SERPL-MCNC: 5.9 MG/DL (ref 4–7.5)
WBC # BLD AUTO: 6.1 X10*3/UL (ref 4.4–11.3)

## 2025-04-03 PROCEDURE — 85652 RBC SED RATE AUTOMATED: CPT | Performed by: NURSE PRACTITIONER

## 2025-04-03 PROCEDURE — 96374 THER/PROPH/DIAG INJ IV PUSH: CPT

## 2025-04-03 PROCEDURE — 85025 COMPLETE CBC W/AUTO DIFF WBC: CPT | Performed by: NURSE PRACTITIONER

## 2025-04-03 PROCEDURE — 80053 COMPREHEN METABOLIC PANEL: CPT | Performed by: NURSE PRACTITIONER

## 2025-04-03 PROCEDURE — 84550 ASSAY OF BLOOD/URIC ACID: CPT | Performed by: NURSE PRACTITIONER

## 2025-04-03 PROCEDURE — 2500000001 HC RX 250 WO HCPCS SELF ADMINISTERED DRUGS (ALT 637 FOR MEDICARE OP): Performed by: NURSE PRACTITIONER

## 2025-04-03 PROCEDURE — 86140 C-REACTIVE PROTEIN: CPT | Performed by: NURSE PRACTITIONER

## 2025-04-03 PROCEDURE — 2500000004 HC RX 250 GENERAL PHARMACY W/ HCPCS (ALT 636 FOR OP/ED): Performed by: NURSE PRACTITIONER

## 2025-04-03 PROCEDURE — 36415 COLL VENOUS BLD VENIPUNCTURE: CPT | Performed by: NURSE PRACTITIONER

## 2025-04-03 RX ORDER — KETOROLAC TROMETHAMINE 30 MG/ML
30 INJECTION, SOLUTION INTRAMUSCULAR; INTRAVENOUS ONCE
Status: COMPLETED | OUTPATIENT
Start: 2025-04-03 | End: 2025-04-03

## 2025-04-03 RX ORDER — CEPHALEXIN 500 MG/1
500 CAPSULE ORAL 4 TIMES DAILY
Qty: 40 CAPSULE | Refills: 0 | Status: SHIPPED | OUTPATIENT
Start: 2025-04-03 | End: 2025-04-13

## 2025-04-03 RX ORDER — CEPHALEXIN 250 MG/1
500 CAPSULE ORAL ONCE
Status: COMPLETED | OUTPATIENT
Start: 2025-04-03 | End: 2025-04-03

## 2025-04-03 RX ORDER — CEPHALEXIN 250 MG/1
250 CAPSULE ORAL 4 TIMES DAILY
Qty: 40 CAPSULE | Refills: 0 | Status: SHIPPED | OUTPATIENT
Start: 2025-04-03 | End: 2025-04-03 | Stop reason: WASHOUT

## 2025-04-03 RX ADMIN — CEPHALEXIN 500 MG: 250 CAPSULE ORAL at 01:05

## 2025-04-03 RX ADMIN — KETOROLAC TROMETHAMINE 30 MG: 30 INJECTION, SOLUTION INTRAMUSCULAR at 00:18

## 2025-04-03 ASSESSMENT — PAIN SCALES - GENERAL: PAINLEVEL_OUTOF10: 4

## 2025-04-03 ASSESSMENT — PAIN - FUNCTIONAL ASSESSMENT: PAIN_FUNCTIONAL_ASSESSMENT: 0-10

## 2025-04-03 ASSESSMENT — PAIN DESCRIPTION - LOCATION: LOCATION: TOE (COMMENT WHICH ONE)

## 2025-04-03 NOTE — ED PROVIDER NOTES
HPI   Chief Complaint   Patient presents with    foot pain     Pt states lt foot pain and burning for 1 week  seen by  yesterday and had X-ray pain worse today   Naproxen 0930         This is a 55-year-old  male presenting to the emergency room with complaints of left foot pain and burning for the past week.  Patient denies any traumatic injury.  The pain is along the dorsum of the foot and along the left great toe.  States that the pain has been intermittent in nature but worsened today.  The patient saw his primary care physician and had an x-ray of the foot yesterday.  He was also given a prescription for naproxen.  He took naproxen at 930 this morning.  The patient denies any cardiovascular issues.  Denies neuropathy or diabetes.  The patient does not have a known history of gout.  Denies any fevers or cold-like symptoms. The patient denies any history of pulmonary embolism or DVT.  Patient is not experiencing any swelling or pain to the thigh or calf.  Denies any recent travel, hospitalization, cancer, or surgery.      History provided by:  Patient   used: No            Patient History   Past Medical History:   Diagnosis Date    Dizziness and giddiness 07/21/2022    Lightheadedness    Personal history of other diseases of the circulatory system     History of atrial fibrillation    Personal history of other endocrine, nutritional and metabolic disease     History of Hashimoto thyroiditis     Past Surgical History:   Procedure Laterality Date    CT ANGIO CORONARY ART WITH HEARTFLOW IF SCORE >30%  1/31/2022    CT HEART CORONARY ANGIOGRAM 1/31/2022 [facility] ANCILLARY LEGACY    CT ANGIO CORONARY ART WITH HEARTFLOW IF SCORE >30%  2/24/2023    CT HEART CORONARY ANGIOGRAM [facility] CT     Family History   Problem Relation Name Age of Onset    Kidney failure Mother      Diabetes type II Father      Nephrolithiasis Father      Sleep apnea Brother      Diabetes Paternal Grandmother      Other  (bilateral renal calculus) Paternal Grandmother       Social History     Tobacco Use    Smoking status: Never    Smokeless tobacco: Never   Vaping Use    Vaping status: Never Used   Substance Use Topics    Alcohol use: Not Currently    Drug use: Never       Physical Exam   ED Triage Vitals [04/02/25 2317]   Temperature Heart Rate Respirations BP   37 °C (98.6 °F) 82 20 131/89      Pulse Ox Temp Source Heart Rate Source Patient Position   96 % Tympanic Monitor Sitting      BP Location FiO2 (%)     Left arm --       Physical Exam  Vitals and nursing note reviewed.   Constitutional:       Appearance: Normal appearance.   HENT:      Head: Normocephalic and atraumatic.      Right Ear: External ear normal.      Left Ear: External ear normal.      Nose: Nose normal.      Mouth/Throat:      Mouth: Mucous membranes are moist.   Eyes:      Conjunctiva/sclera: Conjunctivae normal.   Cardiovascular:      Rate and Rhythm: Normal rate and regular rhythm.      Pulses: Normal pulses.   Pulmonary:      Effort: Pulmonary effort is normal.      Breath sounds: Normal breath sounds.   Genitourinary:     Comments: No CVA tenderness or pubic pain.  Musculoskeletal:         General: Normal range of motion.      Comments: Moves all extremities with normal range of motion and muscle strength.  The patient has mild swelling noted throughout the dorsum of the left foot and into the left great toe.  There is mild erythema noted to the great toe.  Distal extremity with brisk cap refill and sensation to light touch.  Pulses are palpable and strong.  No calf pain or palpable cords.  Gait is stable.   Skin:     General: Skin is warm and dry.      Capillary Refill: Capillary refill takes less than 2 seconds.   Neurological:      General: No focal deficit present.      Mental Status: He is alert and oriented to person, place, and time.           ED Course & MDM   Diagnoses as of 04/03/25 0055   Left foot pain   Cellulitis of left lower extremity                  No data recorded     [place] Coma Scale Score: 15 (04/02/25 2315 : Rose Javier RN)                           Medical Decision Making  The patient was seen and evaluated by the nurse practitioner, Brittany Carson.  The patient is presenting to the emergency room with complaints of left foot pain.  Differential diagnosis includes arthritis, fracture, sprain, cellulitis, gout, or other acute process.  We have a very low suspicion for a DVT due to the lack of risk factors.  A saline lock was established.  Laboratory studies were drawn with results as noted.  The patient does not have any acute leukocytosis.  Inflammatory markers were negative.  Uric acid was within normal limits.  The patient was medicated with ketorolac 30 mg IV.  His outpatient x-ray was evaluated and showed soft tissue swelling with no acute osseous abnormality.  We we will treat the patient empirically for cellulitis.  He was notified of his imaging and laboratory results.  The plan of care was discussed with the patient.  He is in agreement with the plan of care.  He was given strict return precautions.  If he has any worsening pain, paresthesias, shortness of breath, chest pain, he is to return to the emergency room for further evaluation.  He was discharged in stable condition with computer discharge instructions given.        Procedure  Procedures     TONEY Chris  04/03/25 0059       LINDA Chris-CNP  04/03/25 0107

## 2025-04-16 ENCOUNTER — APPOINTMENT (OUTPATIENT)
Dept: PODIATRY | Facility: CLINIC | Age: 55
End: 2025-04-16
Payer: MEDICAID

## 2025-04-16 DIAGNOSIS — M19.079 1ST MTP ARTHRITIS: Primary | ICD-10-CM

## 2025-04-16 DIAGNOSIS — M19.072 ARTHRITIS OF LEFT MIDFOOT: ICD-10-CM

## 2025-04-16 DIAGNOSIS — M79.672 LEFT FOOT PAIN: ICD-10-CM

## 2025-04-16 PROCEDURE — 99204 OFFICE O/P NEW MOD 45 MIN: CPT | Performed by: PODIATRIST

## 2025-04-16 RX ORDER — IBUPROFEN 600 MG/1
600 TABLET ORAL 3 TIMES DAILY
Qty: 90 TABLET | Refills: 0 | Status: SHIPPED | OUTPATIENT
Start: 2025-04-16 | End: 2025-05-16

## 2025-04-16 NOTE — PROGRESS NOTES
History of Present Illness:   Patient states they are here for foot pain  Most bothersome when walking    Has history of redness and swelling in left foot  States he was given meds for gout  Was also told it was cellulitis    Denies trauma  Denies DM  Pain continues to be tender    No other pedal complaints  Looking for relief    Past Medical History  Medical History[1]    Medications and Allergies have been reviewed.    Review Of Systems:  GENERAL: No weight loss, malaise or fevers.  HEENT: Negative for frequent or significant headaches,   RESPIRATORY: Negative for cough, wheezing or shortness of breath.  CARDIOVASCULAR: Negative for chest pain, leg swelling or palpitations.    Examination of Both Lower Extremities:   Objective:   Vasc: DP and PT pulses are palpable bilateral.  CFT is less than 3 seconds bilateral.  Skin temperature is warm to cool proximal to distal bilateral.      Neuro: Vibratory, light touch and proprioception are intact bilateral.      Derm: Nails 1-5 bilateral are intact.     Ortho: Muscle strength is 5/5 for all pedal groups tested.  Pain with palpation to left midfoot and 1st MTPJ Rom. NO edema, erythema or ecchymosis noted.   1. 1st MTP arthritis  Uric acid    ibuprofen 600 mg tablet    Uric acid      2. Left foot pain        3. Arthritis of left midfoot          Patient exam and eval  Discussed arthritic changes in feet  Discussed causes, symptoms, aggravating factors and treatment options  Discussed radiographs - reviewed with patient  Reviewed labs, CMP, ESR, uric acid and CRP  All Wnl  Discussed this is most likely arthritic flare  Did order another uric acid lab. If the redness and pain comes back get lab completed.   Dicscussed lab is good for 1 year  Recommend stiff supportive shoe gear  Shoes that do not twist or bend  Minimize walking barefoot  Discussed 80/20 rule  Discussed ice, nsaids, batool brian/biofreeze  Called in ibu  Patient to follow up if no improvement noted.   Pt in  agreement to plan  All questions answered          Yeny Haines DPM  [phone]  Option 2  Fax: [phone]         [1]   Past Medical History:  Diagnosis Date    Dizziness and giddiness 07/21/2022    Lightheadedness    Personal history of other diseases of the circulatory system     History of atrial fibrillation    Personal history of other endocrine, nutritional and metabolic disease     History of Hashimoto thyroiditis

## 2025-04-17 ENCOUNTER — APPOINTMENT (OUTPATIENT)
Dept: PRIMARY CARE | Facility: CLINIC | Age: 55
End: 2025-04-17
Payer: MEDICAID

## 2025-04-17 VITALS
DIASTOLIC BLOOD PRESSURE: 76 MMHG | SYSTOLIC BLOOD PRESSURE: 128 MMHG | WEIGHT: 227 LBS | HEART RATE: 78 BPM | OXYGEN SATURATION: 97 % | BODY MASS INDEX: 32.57 KG/M2

## 2025-04-17 DIAGNOSIS — L98.9 SKIN LESION OF HAND: Primary | ICD-10-CM

## 2025-04-17 DIAGNOSIS — B07.9 WART OF HAND: ICD-10-CM

## 2025-04-17 NOTE — PROGRESS NOTES
Subjective   Patient ID: Julian Juarez is a 55 y.o. male who presents for skin biopsy right hand.  HPI  The patient is coming the office today with a chief complaint of a skin lesion on his right hand at the base of his right thumb.  This was a new dark skin lesion.  He denies any drainage from it, denies any redness, itching or pain.  No family history of any skin cancers and no personal history of any skin cancers.    Patient also has a hard lesion on his fourth digit that is less than 2mm in diameter  Review of Systems    Objective   Physical Exam  General: Patient is alert and orient x 3 appears in no acute distress    Heart: Regular rhythm no murmurs clicks or gallops    Lungs: Clear auscultation bilaterally without [place] wheezing    Skin: Dorsal aspect right thumb proximal to the PIP is a 6 mm brown skin lesion that is only slightly raised.  Fourth digit dorsal side there is a verrucous lesion that is 2 to 3 mm in diameter.  Assessment/Plan   Problem List Items Addressed This Visit    None  Visit Diagnoses         Skin lesion of hand    -  Primary    Relevant Orders    DERMATOPATHOLOGY -DERMPATH LAB      Wart of hand            Right hand dorsal thumb skin lesion 6 mm.  Patient was prepped in a sterile fashion using iodine swab and alcohol swab.  25-gauge needle was used to inject 1% lidocaine and epi.  6 mm punch biopsy was used to cut the area and then pickups and an 11 blade scalpel was used to remove the lesion.  It was sent for dermatopathology    4th digit verrucus lesion frozen with cold spray.  Instructed to crush and aspirin and placed on their nightly

## 2025-04-22 DIAGNOSIS — R00.2 PALPITATIONS: ICD-10-CM

## 2025-04-22 RX ORDER — METOPROLOL TARTRATE 50 MG/1
50 TABLET ORAL 2 TIMES DAILY
Qty: 180 TABLET | Refills: 3 | Status: SHIPPED | OUTPATIENT
Start: 2025-04-22 | End: 2026-04-22

## 2025-04-23 ENCOUNTER — TELEPHONE (OUTPATIENT)
Dept: PRIMARY CARE | Facility: CLINIC | Age: 55
End: 2025-04-23
Payer: MEDICAID

## 2025-04-23 NOTE — TELEPHONE ENCOUNTER
Patient was in the office last week and was advised to put antibiotic ointment on his hand and place a band-aid on his hand. Patient has been doing this for the past week, he is wanting to know how long he has to do this?

## 2025-04-24 LAB
LABORATORY COMMENT REPORT: NORMAL
PATH REPORT.FINAL DX SPEC: NORMAL
PATH REPORT.GROSS SPEC: NORMAL
PATH REPORT.RELEVANT HX SPEC: NORMAL
PATH REPORT.TOTAL CANCER: NORMAL

## 2025-04-24 NOTE — TELEPHONE ENCOUNTER
Patient advised to leave the area open. Patient questioned if we had received his biopsy report back. The results are still pending. Patient advised if he doesn't hear anything by Tuesday to call the office so we can call over to [facility] and see about the results.

## 2025-04-29 ENCOUNTER — TELEPHONE (OUTPATIENT)
Dept: CARDIOLOGY | Facility: HOSPITAL | Age: 55
End: 2025-04-29
Payer: MEDICAID

## 2025-04-29 NOTE — TELEPHONE ENCOUNTER
Patient called office stating he has experienced palpitations in 10 second durations multiple times over the past few weeks. Patient asks to speak with care team to discuss treatment going forward. Patient states he hasn't noticed any triggers and has not been consuming caffeine.

## 2025-04-30 NOTE — PROGRESS NOTES
Electrophysiology Follow up Visit    History of Present Illness:  Julian Juarez is a 55 y.o. year old male patient with history of atrial fibrillation and SHERMAN.    Patient referred for evaluation of atrial fibrillation in 2023.  Patient reports episodes of atrial fibrillation since ~2010.  Episodes could last 5 minutes to 8 hours and usually occurred at night or early morning.  Initially treated with diltiazem.  OAC not required as [id number] score was 0.  He was started on metoprolol though discontinued it at some point in 2023. Monitor placed in June 2024 with very low burden of PAC and PVCs which correlated with patient's symptoms. Metoprolol was restarted.     Patient here for follow up for atrial fibrillation and palpitations. He reports increased palpitations over the past month, episodes lasting <1 min. He notices brief SOB and lightheadedness with palpitations. He denies syncope. Limited caffeine use.     Medical History:  He has a past medical history of Dizziness and giddiness (07/21/2022), Personal history of other diseases of the circulatory system, and Personal history of other endocrine, nutritional and metabolic disease.    Surgical History:  He has a past surgical history that includes CT angio coronary art with heartflow if score >30% (1/31/2022) and CT angio coronary art with heartflow if score >30% (2/24/2023).     Social History:  He reports that he has never smoked. He has never used smokeless tobacco. He reports that he does not currently use alcohol. He reports that he does not use drugs.       Family History[1]     ROS:  A 14 point review of systems was done and is negative other than as stated in HPI    Physical Exam  Constitutional:       Appearance: Normal appearance.   Eyes:      Pupils: Pupils are equal, round, and reactive to light.   Cardiovascular:      Rate and Rhythm: Normal rate and regular rhythm.      Heart sounds: Normal heart sounds.   Pulmonary:      Effort: Pulmonary  effort is normal.      Breath sounds: Normal breath sounds.   Musculoskeletal:         General: Normal range of motion.   Skin:     General: Skin is warm and dry.   Neurological:      Mental Status: He is alert and oriented to person, place, and time.       Allergies:  Patient has no known allergies.    Outpatient Medications:  Current Outpatient Medications   Medication Instructions    ergocalciferol (VITAMIN D-2) 1,250 mcg, oral, Once Weekly    ibuprofen 600 mg, oral, 3 times daily    levothyroxine (SYNTHROID, LEVOXYL) 112 mcg, oral, Daily    metoprolol tartrate (LOPRESSOR) 50 mg, oral, 2 times daily    omeprazole (PRILOSEC) 20 mg, oral, Daily before breakfast, Do not crush or chew.    rosuvastatin (CRESTOR) 5 mg, oral, Daily         Reviewed Labs:  CBC  Lab Results   Component Value Date    WBC 6.1 04/03/2025    WBC 5.3 03/12/2025    HGB 14.9 04/03/2025    HGB 15.7 03/12/2025    HCT 43.2 04/03/2025    HCT 46.3 03/12/2025    MCV 87 04/03/2025    MCV 90.4 03/12/2025     04/03/2025     03/12/2025        Renal Function Panel  Lab Results   Component Value Date    GLUCOSE 112 (H) 04/03/2025    GLUCOSE 114 (H) 03/12/2025     04/03/2025     03/12/2025    K 3.7 04/03/2025    K 4.0 03/12/2025     04/03/2025     03/12/2025    CO2 26 04/03/2025    CO2 25 03/12/2025    ANIONGAP 11 04/03/2025    ANIONGAP 8 03/12/2025    BUN 21 04/03/2025    BUN 15 03/12/2025    CREATININE 0.96 04/03/2025    CREATININE 0.79 03/12/2025    GFRMALE >90 09/27/2023    GFRF CANCELED 02/26/2023    CALCIUM 9.4 04/03/2025    CALCIUM 9.2 03/12/2025        CMP  Lab Results   Component Value Date    CALCIUM 9.4 04/03/2025    CALCIUM 9.2 03/12/2025    PROT 6.7 04/03/2025    PROT 6.8 03/12/2025    ALBUMIN 4.0 04/03/2025    ALBUMIN 4.3 03/12/2025    AST 18 04/03/2025    AST 22 03/12/2025    ALT 18 04/03/2025    ALT 28 03/12/2025    ALKPHOS 55 04/03/2025    ALKPHOS 58 03/12/2025    BILITOT 0.4 04/03/2025    BILITOT 0.8  "03/12/2025        Mg   Lab Results   Component Value Date    MG 1.91 02/26/2023        Thyroid  Lab Results   Component Value Date    TSH 2.55 03/12/2025    FREET4 0.95 09/23/2024    T3FREE 3.6 03/12/2025        Visit Vitals  /84   Pulse 75   Ht 1.778 m (5' 10\")   Wt 103 kg (227 lb)   BMI 32.57 kg/m²   Smoking Status Never   BSA 2.26 m²        Cardiac Testing Reviewed Study(s):  Echo (December/2023):   CONCLUSIONS:   1. Left ventricular systolic function is normal with a 55% estimated ejection fraction.   2. Spectral Doppler shows an impaired relaxation pattern of left ventricular diastolic filling.  Monitor (June/2024):    Sinus rhythm with average heart rate 82 ()  <1% PVC burden  1% SVC burden  <1 episode of SVT, 14 beats  No atrial fibrillation  Symptoms associated with PACs and PVCs  Monitor (October/2023):  Average heart rate 81 bpm  <1% SVC burden  <1% PVC burden  No atrial fibrillation  Single SVT of 4 beats  Symptoms associated with brief SVT  ECGs (reviewed and my interpretation):   6/26/2024 sinus rhythm with rate of 79 bpm  5/5/2025 sinus rhythm with rate of 75 bpm    Assessment  Palpitations:  Recurrent palpitations  October 2023 monitor with low burden PACs and PVCs and brief SVT which correlated with symptoms  Started on metoprolol  Patient discontinued in 2024  Repeat monitor for palpitations in June 2024 with no atrial fibrillation and very minimal PACs/PVCs  Metoprolol restarted    ECG personally reviewed today showed sinus rhythm. Reviewed prior monitor which shows brief palpitations and no recurrent atrial fibrillation. Recent labs reviewed.  Patient currently taking metoprolol once a day.  Will have patient increase metoprolol to twice daily.  Also encouraged patient to restart using CPAP to treat SHERMAN.  We can follow-up  in 3 months.  Patient will notify office if he has concerns prior to follow-up appointment.    Atrial fibrillation:  Patient reports diagnosis of atrial fibrillation " ~2010, paroxysmal  Initially treated with diltiazem  OAC not required as [id number] score 0    No known recurrence of atrial fibrillation in the past few years.  Reviewed lifestyle to minimize reoccurrence of atrial fibrillation.  [id number] score remains 0 so OAC not required at this time.    SHERMAN:  Not using CPAP currently    Encouraged patient to restart using CPAP  He has follow up with sleep medicine later this week    Plan  Increase metoprolol to twice daily  Resume using CPAP  Follow up in 3 months      Exclusive of any other services or procedures performed, Kathie YANEZ, LINDA-CNP , spent 30 minutes in duration for this visit today.  This time consisted of chart review, obtaining history, and/or performing the exam as documented above as well as documenting the clinical information for the encounter in the electronic record, discussing treatment options, plans, and/or goals with patient, family, and/or caregiver, refilling medications, updating the electronic record, ordering medicines, lab work, imaging, referrals, and/or procedures as documented above and communicating with other [facility]. I have discussed the results of laboratory, radiology, and cardiology studies with the patient and their family/caregiver.          [1]   Family History  Problem Relation Name Age of Onset    Kidney failure Mother      Diabetes type II Father      Nephrolithiasis Father      Sleep apnea Brother      Diabetes Paternal Grandmother      Other (bilateral renal calculus) Paternal Grandmother

## 2025-05-05 ENCOUNTER — OFFICE VISIT (OUTPATIENT)
Dept: CARDIOLOGY | Facility: HOSPITAL | Age: 55
End: 2025-05-05
Payer: MEDICAID

## 2025-05-05 VITALS
WEIGHT: 227 LBS | BODY MASS INDEX: 32.5 KG/M2 | HEART RATE: 75 BPM | HEIGHT: 70 IN | DIASTOLIC BLOOD PRESSURE: 84 MMHG | SYSTOLIC BLOOD PRESSURE: 126 MMHG

## 2025-05-05 DIAGNOSIS — R94.31 ABNORMAL EKG: ICD-10-CM

## 2025-05-05 DIAGNOSIS — R00.2 PALPITATIONS: Primary | ICD-10-CM

## 2025-05-05 DIAGNOSIS — I48.0 PAROXYSMAL ATRIAL FIBRILLATION (MULTI): ICD-10-CM

## 2025-05-05 PROCEDURE — 3008F BODY MASS INDEX DOCD: CPT | Performed by: NURSE PRACTITIONER

## 2025-05-05 PROCEDURE — 99214 OFFICE O/P EST MOD 30 MIN: CPT | Performed by: NURSE PRACTITIONER

## 2025-05-05 PROCEDURE — 93005 ELECTROCARDIOGRAM TRACING: CPT | Performed by: NURSE PRACTITIONER

## 2025-05-05 PROCEDURE — 1036F TOBACCO NON-USER: CPT | Performed by: NURSE PRACTITIONER

## 2025-05-05 PROCEDURE — 99214 OFFICE O/P EST MOD 30 MIN: CPT | Mod: 25 | Performed by: NURSE PRACTITIONER

## 2025-05-05 PROCEDURE — 93010 ELECTROCARDIOGRAM REPORT: CPT | Performed by: INTERNAL MEDICINE

## 2025-05-05 RX ORDER — METOPROLOL TARTRATE 50 MG/1
50 TABLET ORAL 2 TIMES DAILY
Qty: 180 TABLET | Refills: 3 | Status: SHIPPED | OUTPATIENT
Start: 2025-05-05 | End: 2026-05-05

## 2025-05-06 ENCOUNTER — APPOINTMENT (OUTPATIENT)
Dept: SLEEP MEDICINE | Facility: CLINIC | Age: 55
End: 2025-05-06
Payer: MEDICAID

## 2025-05-08 LAB
ATRIAL RATE: 75 BPM
P AXIS: 47 DEGREES
P OFFSET: 211 MS
P ONSET: 159 MS
PR INTERVAL: 132 MS
Q ONSET: 225 MS
QRS COUNT: 12 BEATS
QRS DURATION: 86 MS
QT INTERVAL: 376 MS
QTC CALCULATION(BAZETT): 419 MS
QTC FREDERICIA: 404 MS
R AXIS: 24 DEGREES
T AXIS: 64 DEGREES
T OFFSET: 413 MS
VENTRICULAR RATE: 75 BPM

## 2025-06-16 ENCOUNTER — HOSPITAL ENCOUNTER (EMERGENCY)
Facility: HOSPITAL | Age: 55
Discharge: HOME | End: 2025-06-16
Attending: STUDENT IN AN ORGANIZED HEALTH CARE EDUCATION/TRAINING PROGRAM
Payer: MEDICAID

## 2025-06-16 ENCOUNTER — OFFICE VISIT (OUTPATIENT)
Dept: PODIATRY | Facility: CLINIC | Age: 55
End: 2025-06-16
Payer: MEDICAID

## 2025-06-16 ENCOUNTER — APPOINTMENT (OUTPATIENT)
Dept: RADIOLOGY | Facility: HOSPITAL | Age: 55
End: 2025-06-16
Payer: MEDICAID

## 2025-06-16 VITALS
RESPIRATION RATE: 18 BRPM | TEMPERATURE: 97.9 F | HEIGHT: 70 IN | BODY MASS INDEX: 31.5 KG/M2 | SYSTOLIC BLOOD PRESSURE: 134 MMHG | OXYGEN SATURATION: 95 % | WEIGHT: 220 LBS | DIASTOLIC BLOOD PRESSURE: 88 MMHG | HEART RATE: 90 BPM

## 2025-06-16 DIAGNOSIS — R22.41 LOCALIZED SWELLING OF RIGHT FOOT: ICD-10-CM

## 2025-06-16 DIAGNOSIS — L03.115 CELLULITIS OF RIGHT LOWER EXTREMITY: Primary | ICD-10-CM

## 2025-06-16 DIAGNOSIS — M79.671 RIGHT FOOT PAIN: Primary | ICD-10-CM

## 2025-06-16 LAB
ALBUMIN SERPL BCP-MCNC: 3.9 G/DL (ref 3.4–5)
ALP SERPL-CCNC: 58 U/L (ref 33–120)
ALT SERPL W P-5'-P-CCNC: 15 U/L (ref 10–52)
ANION GAP SERPL CALC-SCNC: 13 MMOL/L (ref 10–20)
AST SERPL W P-5'-P-CCNC: 18 U/L (ref 9–39)
BASOPHILS # BLD AUTO: 0.06 X10*3/UL (ref 0–0.1)
BASOPHILS NFR BLD AUTO: 1 %
BILIRUB SERPL-MCNC: 0.3 MG/DL (ref 0–1.2)
BUN SERPL-MCNC: 18 MG/DL (ref 6–23)
CALCIUM SERPL-MCNC: 8.7 MG/DL (ref 8.6–10.3)
CHLORIDE SERPL-SCNC: 106 MMOL/L (ref 98–107)
CO2 SERPL-SCNC: 24 MMOL/L (ref 21–32)
CREAT SERPL-MCNC: 0.87 MG/DL (ref 0.5–1.3)
CRP SERPL-MCNC: 0.42 MG/DL
EGFRCR SERPLBLD CKD-EPI 2021: >90 ML/MIN/1.73M*2
EOSINOPHIL # BLD AUTO: 0.17 X10*3/UL (ref 0–0.7)
EOSINOPHIL NFR BLD AUTO: 2.7 %
ERYTHROCYTE [DISTWIDTH] IN BLOOD BY AUTOMATED COUNT: 12.3 % (ref 11.5–14.5)
ERYTHROCYTE [SEDIMENTATION RATE] IN BLOOD BY WESTERGREN METHOD: 4 MM/H (ref 0–20)
GLUCOSE SERPL-MCNC: 134 MG/DL (ref 74–99)
HCT VFR BLD AUTO: 41.3 % (ref 41–52)
HGB BLD-MCNC: 13.9 G/DL (ref 13.5–17.5)
IMM GRANULOCYTES # BLD AUTO: 0.02 X10*3/UL (ref 0–0.7)
IMM GRANULOCYTES NFR BLD AUTO: 0.3 % (ref 0–0.9)
LACTATE SERPL-SCNC: 1.2 MMOL/L (ref 0.4–2)
LYMPHOCYTES # BLD AUTO: 2.32 X10*3/UL (ref 1.2–4.8)
LYMPHOCYTES NFR BLD AUTO: 37.2 %
MCH RBC QN AUTO: 29.8 PG (ref 26–34)
MCHC RBC AUTO-ENTMCNC: 33.7 G/DL (ref 32–36)
MCV RBC AUTO: 88 FL (ref 80–100)
MONOCYTES # BLD AUTO: 0.58 X10*3/UL (ref 0.1–1)
MONOCYTES NFR BLD AUTO: 9.3 %
NEUTROPHILS # BLD AUTO: 3.08 X10*3/UL (ref 1.2–7.7)
NEUTROPHILS NFR BLD AUTO: 49.5 %
NRBC BLD-RTO: 0 /100 WBCS (ref 0–0)
PLATELET # BLD AUTO: 356 X10*3/UL (ref 150–450)
POTASSIUM SERPL-SCNC: 3.8 MMOL/L (ref 3.5–5.3)
PROT SERPL-MCNC: 6.5 G/DL (ref 6.4–8.2)
RBC # BLD AUTO: 4.67 X10*6/UL (ref 4.5–5.9)
SODIUM SERPL-SCNC: 139 MMOL/L (ref 136–145)
WBC # BLD AUTO: 6.2 X10*3/UL (ref 4.4–11.3)

## 2025-06-16 PROCEDURE — 80053 COMPREHEN METABOLIC PANEL: CPT | Performed by: PHYSICIAN ASSISTANT

## 2025-06-16 PROCEDURE — 85652 RBC SED RATE AUTOMATED: CPT | Performed by: PHYSICIAN ASSISTANT

## 2025-06-16 PROCEDURE — 99214 OFFICE O/P EST MOD 30 MIN: CPT | Performed by: PODIATRIST

## 2025-06-16 PROCEDURE — 73590 X-RAY EXAM OF LOWER LEG: CPT | Mod: RIGHT SIDE | Performed by: RADIOLOGY

## 2025-06-16 PROCEDURE — 86140 C-REACTIVE PROTEIN: CPT | Performed by: PHYSICIAN ASSISTANT

## 2025-06-16 PROCEDURE — 85025 COMPLETE CBC W/AUTO DIFF WBC: CPT | Performed by: PHYSICIAN ASSISTANT

## 2025-06-16 PROCEDURE — 73590 X-RAY EXAM OF LOWER LEG: CPT | Mod: RT

## 2025-06-16 PROCEDURE — 99284 EMERGENCY DEPT VISIT MOD MDM: CPT | Performed by: STUDENT IN AN ORGANIZED HEALTH CARE EDUCATION/TRAINING PROGRAM

## 2025-06-16 PROCEDURE — 36415 COLL VENOUS BLD VENIPUNCTURE: CPT | Performed by: PHYSICIAN ASSISTANT

## 2025-06-16 PROCEDURE — 1036F TOBACCO NON-USER: CPT | Performed by: PODIATRIST

## 2025-06-16 PROCEDURE — 83605 ASSAY OF LACTIC ACID: CPT | Performed by: PHYSICIAN ASSISTANT

## 2025-06-16 RX ORDER — CEPHALEXIN 500 MG/1
500 CAPSULE ORAL 4 TIMES DAILY
Qty: 40 CAPSULE | Refills: 0 | Status: SHIPPED | OUTPATIENT
Start: 2025-06-16 | End: 2025-06-26

## 2025-06-16 RX ORDER — TERBINAFINE HYDROCHLORIDE 250 MG/1
250 TABLET ORAL DAILY
Qty: 14 TABLET | Refills: 0 | Status: SHIPPED | OUTPATIENT
Start: 2025-06-16 | End: 2025-06-30

## 2025-06-16 ASSESSMENT — PAIN DESCRIPTION - LOCATION: LOCATION: LEG

## 2025-06-16 ASSESSMENT — LIFESTYLE VARIABLES
HAVE PEOPLE ANNOYED YOU BY CRITICIZING YOUR DRINKING: NO
EVER FELT BAD OR GUILTY ABOUT YOUR DRINKING: NO
TOTAL SCORE: 0
EVER HAD A DRINK FIRST THING IN THE MORNING TO STEADY YOUR NERVES TO GET RID OF A HANGOVER: NO
HAVE YOU EVER FELT YOU SHOULD CUT DOWN ON YOUR DRINKING: NO

## 2025-06-16 ASSESSMENT — PAIN DESCRIPTION - PAIN TYPE: TYPE: ACUTE PAIN

## 2025-06-16 ASSESSMENT — PAIN DESCRIPTION - ORIENTATION: ORIENTATION: RIGHT

## 2025-06-16 ASSESSMENT — PAIN SCALES - GENERAL: PAINLEVEL_OUTOF10: 5 - MODERATE PAIN

## 2025-06-16 ASSESSMENT — PAIN DESCRIPTION - FREQUENCY: FREQUENCY: OTHER (COMMENT)

## 2025-06-16 ASSESSMENT — PAIN - FUNCTIONAL ASSESSMENT: PAIN_FUNCTIONAL_ASSESSMENT: 0-10

## 2025-06-16 NOTE — ED TRIAGE NOTES
Pt. to ED c/o pain to right lower shin x1 week. Pt. reports pain when walking/pushing on area. Redness noted. Pt. denies any any injury.

## 2025-06-16 NOTE — ED PROVIDER NOTES
EMERGENCY MEDICINE EVALUATION NOTE    History of Present Illness     Chief Complaint:   Chief Complaint   Patient presents with    Leg Pain       HPI: Julian Juarez is a 55 y.o. male presents with a chief complaint of right lower extremity pain.  Patient reports he is having pain over the right anterior shin.  He states has been there for 1 week has gotten progressively worse.  He states that now he notices that there is pain in the area whenever he walks on it.  He also notes that there is erythema with warmth at the area.  He states the pain is only located in the right anterior shin he states that there is no pain in the right calf.  Does have a history of atrial fibrillation that is paroxysmal and is not anticoagulated.  Patient denies any other significant past medical history other than thyroid issues.  Patient denies any diffuse fevers or chills.  Patient denies any chest pain or shortness of breath.  Patient reports he is not taking any at home for symptoms.    Previous History   Medical History[1]  Surgical History[2]  Social History[3]  Family History[4]  Allergies[5]  Current Outpatient Medications   Medication Instructions    cephalexin (KEFLEX) 500 mg, oral, 4 times daily    ergocalciferol (VITAMIN D-2) 1,250 mcg, oral, Once Weekly    levothyroxine (SYNTHROID, LEVOXYL) 112 mcg, oral, Daily    metoprolol tartrate (LOPRESSOR) 50 mg, oral, 2 times daily    omeprazole (PRILOSEC) 20 mg, oral, Daily before breakfast, Do not crush or chew.    rosuvastatin (CRESTOR) 5 mg, oral, Daily       Physical Exam     Appearance: Alert, oriented , cooperative,  in no acute distress.      Skin: Intact,  dry skin, no lesions, petechiae or purpura.  Erythema warmth and induration noted over the distal one third of the patient's right anterior shin.     Eyes: PERRLA, EOMs intact,  Conjunctiva pink with no redness or exudates.      ENT: Hearing grossly intact. Pharynx clear, uvula midline.      Neck: Supple. Trachea at  "midline. No lymphadenopathy.     Pulmonary: Clear bilaterally. No rales, rhonchi or wheezing. No accessory muscle use or stridor.     Cardiac: Normal rate and rhythm without murmur     Abdomen: Soft, nontender, active bowel sounds.     Musculoskeletal: Full range of motion.  No significant edema noted of the right lower extremity.  No calf tenderness, no palpable cord in the right groin.     Neurological:Cranial nerves II through XII are grossly intact, normal sensation, no weakness, no focal findings identified.     Results     Labs Reviewed   SEDIMENTATION RATE, AUTOMATED - Normal       Result Value    Sedimentation Rate 4     CBC WITH AUTO DIFFERENTIAL    WBC 6.2      nRBC 0.0      RBC 4.67      Hemoglobin 13.9      Hematocrit 41.3      MCV 88      MCH 29.8      MCHC 33.7      RDW 12.3      Platelets 356      Neutrophils % 49.5      Immature Granulocytes %, Automated 0.3      Lymphocytes % 37.2      Monocytes % 9.3      Eosinophils % 2.7      Basophils % 1.0      Neutrophils Absolute 3.08      Immature Granulocytes Absolute, Automated 0.02      Lymphocytes Absolute 2.32      Monocytes Absolute 0.58      Eosinophils Absolute 0.17      Basophils Absolute 0.06     COMPREHENSIVE METABOLIC PANEL   LACTATE   C-REACTIVE PROTEIN     XR tibia fibula right 2 views    (Results Pending)         ED Course & Medical Decision Making   Medications - No data to display  Heart Rate:  [90]   Temperature:  [36.6 °C (97.9 °F)]   Respirations:  [18]   BP: (134)/(88)   Height:  [177.8 cm (5' 10\")]   Weight:  [99.8 kg (220 lb)]   Pulse Ox:  [95 %]    ED Course as of 06/16/25 0151 Mon Jun 16, 2025   0150 Patient endorsed to the attending at shift change pending reevaluation after imaging and blood work. [CJ]      ED Course User Index  [CJ] Sergio Rivera PA-C         Diagnoses as of 06/16/25 0151   Cellulitis of right lower extremity       Procedures   Procedures    Diagnosis     1. Cellulitis of right lower extremity        Disposition "   Pending reevaluation after imaging and lab work    ED Prescriptions       Medication Sig Dispense Start Date End Date Auth. Provider    cephalexin (Keflex) 500 mg capsule Take 1 capsule (500 mg) by mouth 4 times a day for 10 days. 40 capsule 6/16/2025 6/26/2025 Sergio Rivera PA-C            Disclaimer: This note was dictated by speech recognition. Minor errors in transcription may be present. Please call if questions.         [1]   Past Medical History:  Diagnosis Date    Dizziness and giddiness 07/21/2022    Lightheadedness    Personal history of other diseases of the circulatory system     History of atrial fibrillation    Personal history of other endocrine, nutritional and metabolic disease     History of Hashimoto thyroiditis   [2]   Past Surgical History:  Procedure Laterality Date    CT ANGIO CORONARY ART WITH HEARTFLOW IF SCORE >30%  1/31/2022    CT HEART CORONARY ANGIOGRAM 1/31/2022 [facility] ANCILLARY LEGACY    CT ANGIO CORONARY ART WITH HEARTFLOW IF SCORE >30%  2/24/2023    CT HEART CORONARY ANGIOGRAM [facility] CT   [3]   Social History  Tobacco Use    Smoking status: Never    Smokeless tobacco: Never   Vaping Use    Vaping status: Never Used   Substance Use Topics    Alcohol use: Not Currently    Drug use: Never   [4]   Family History  Problem Relation Name Age of Onset    Kidney failure Mother      Diabetes type II Father      Nephrolithiasis Father      Sleep apnea Brother      Diabetes Paternal Grandmother      Other (bilateral renal calculus) Paternal Grandmother     [5] No Known Allergies       Sergio Rivera PA-C  06/16/25 0151

## 2025-06-16 NOTE — PROGRESS NOTES
History of Present Illness:   Patient presents for same day add on    Patient states they are here for foot pain  Most bothersome when walking    Has history of redness and swelling in left foot  States he was given meds for gout  Was also told it was cellulitis    Denies trauma  Denies DM  Pain continues to be tender    No other pedal complaints  Looking for relief    Past Medical History  [Medical History]    [Medical History]  Past Medical History  Diagnosis Date    Dizziness and giddiness 07/21/2022    Lightheadedness    Personal history of other diseases of the circulatory system     History of atrial fibrillation    Personal history of other endocrine, nutritional and metabolic disease     History of Hashimoto thyroiditis       Medications and Allergies have been reviewed.    Review Of Systems:  GENERAL: No weight loss, malaise or fevers.  HEENT: Negative for frequent or significant headaches,   RESPIRATORY: Negative for cough, wheezing or shortness of breath.  CARDIOVASCULAR: Negative for chest pain, leg swelling or palpitations.    Examination of Both Lower Extremities:   Objective:   Vasc: DP and PT pulses are palpable bilateral.  CFT is less than 3 seconds bilateral.  Skin temperature is warm to cool proximal to distal bilateral.      Neuro: Vibratory, light touch and proprioception are intact bilateral.      Derm: Nails 1-5 bilateral are intact.     Ortho: Muscle strength is 5/5 for all pedal groups tested.    Right foot pain, erythema and edema  NO edema, erythema or ecchymosis noted.     1. Right foot pain  terbinafine (LamISIL) 250 mg tablet      2. Localized swelling of right foot            Patient exam and eval  Revd prior labs and xrays  Rxd keflex  Recommend getting uric acid drawn, has req in chart  Called in 2 weeks of lamisil  Fu in 3 weeks for check  Call if any other concerns arise  Patient to follow up if no improvement noted.   Pt in agreement to plan  All questions  answered          Yeny Haines DPM  [phone]  [facility]  Fax: [phone]

## 2025-06-18 LAB — URATE SERPL-MCNC: 4.9 MG/DL (ref 4–8)

## 2025-06-30 ENCOUNTER — TELEPHONE (OUTPATIENT)
Dept: PRIMARY CARE | Facility: CLINIC | Age: 55
End: 2025-06-30
Payer: MEDICAID

## 2025-06-30 DIAGNOSIS — R53.83 OTHER FATIGUE: Primary | ICD-10-CM

## 2025-06-30 NOTE — TELEPHONE ENCOUNTER
Pt is currently taking Levothyroxine 112mcg he has hashimoto's he is not sure if it is contributing but has been having symptoms of No energy, muscle aches, he is wondering if you would order blood work to check his thyroid levels and possibly testosterone levels?

## 2025-07-04 LAB
T3FREE SERPL-MCNC: 3.3 PG/ML (ref 2.3–4.2)
T4 FREE SERPL-MCNC: 1.3 NG/DL (ref 0.8–1.8)
TESTOST FREE SERPL-MCNC: NORMAL PG/ML
TESTOST SERPL-MCNC: NORMAL NG/DL
TSH SERPL-ACNC: 7.95 MIU/L (ref 0.4–4.5)

## 2025-07-07 ENCOUNTER — APPOINTMENT (OUTPATIENT)
Dept: PODIATRY | Facility: CLINIC | Age: 55
End: 2025-07-07
Payer: MEDICAID

## 2025-07-07 DIAGNOSIS — R22.41 LOCALIZED SWELLING OF RIGHT FOOT: ICD-10-CM

## 2025-07-07 DIAGNOSIS — E06.3 HASHIMOTO'S THYROIDITIS: ICD-10-CM

## 2025-07-07 DIAGNOSIS — M79.671 RIGHT FOOT PAIN: Primary | ICD-10-CM

## 2025-07-07 DIAGNOSIS — M79.672 LEFT FOOT PAIN: ICD-10-CM

## 2025-07-07 LAB
T3FREE SERPL-MCNC: 3.3 PG/ML (ref 2.3–4.2)
T4 FREE SERPL-MCNC: 1.3 NG/DL (ref 0.8–1.8)
TESTOST FREE SERPL-MCNC: 63 PG/ML (ref 35–155)
TESTOST SERPL-MCNC: 375 NG/DL (ref 250–1100)
TSH SERPL-ACNC: 7.95 MIU/L (ref 0.4–4.5)

## 2025-07-07 PROCEDURE — 99213 OFFICE O/P EST LOW 20 MIN: CPT | Performed by: PODIATRIST

## 2025-07-07 PROCEDURE — 1036F TOBACCO NON-USER: CPT | Performed by: PODIATRIST

## 2025-07-07 RX ORDER — LEVOTHYROXINE SODIUM 125 UG/1
125 TABLET ORAL DAILY
Qty: 30 TABLET | Refills: 11 | Status: SHIPPED | OUTPATIENT
Start: 2025-07-07 | End: 2026-07-07

## 2025-07-07 NOTE — PROGRESS NOTES
History of Present Illness:   Patient states they are here for foot pain  Most bothersome when walking    States the redness cleared up withina few days of the oral abx    States he did get his uric acid - it was wnl    Denies any new complaints    Pleased with progress.     No other pedal concerns    Past Medical History  [Medical History]    [Medical History]  Past Medical History  Diagnosis Date    Dizziness and giddiness 07/21/2022    Lightheadedness    Personal history of other diseases of the circulatory system     History of atrial fibrillation    Personal history of other endocrine, nutritional and metabolic disease     History of Hashimoto thyroiditis       Medications and Allergies have been reviewed.    Review Of Systems:  GENERAL: No weight loss, malaise or fevers.  HEENT: Negative for frequent or significant headaches,   RESPIRATORY: Negative for cough, wheezing or shortness of breath.  CARDIOVASCULAR: Negative for chest pain, leg swelling or palpitations.    Examination of Both Lower Extremities:   Objective:   Vasc: DP and PT pulses are palpable bilateral.  CFT is less than 3 seconds bilateral.  Skin temperature is warm to cool proximal to distal bilateral.      Neuro: Vibratory, light touch and proprioception are intact bilateral.      Derm: Nails 1-5 bilateral are intact.     Ortho: Muscle strength is 5/5 for all pedal groups tested.    No pain with palpation. No streaking noted. NO edema, erythema or ecchymosis noted.     1. Right foot pain        2. Left foot pain        3. Localized swelling of right foot          Patient exam and eval  Pleased with progress  Discussed gout vs arthritis  Reviewed ua lab - wnl  Patient to follow up if no improvement noted.   Pt in agreement to plan  All questions answered          Yeny Haines DPM  [phone]  [facility]  Fax: [phone]

## 2025-07-24 ENCOUNTER — APPOINTMENT (OUTPATIENT)
Facility: CLINIC | Age: 55
End: 2025-07-24
Payer: MEDICAID

## 2025-07-24 VITALS
HEART RATE: 91 BPM | HEIGHT: 70 IN | SYSTOLIC BLOOD PRESSURE: 122 MMHG | DIASTOLIC BLOOD PRESSURE: 84 MMHG | BODY MASS INDEX: 31.24 KG/M2 | OXYGEN SATURATION: 95 % | WEIGHT: 218.2 LBS

## 2025-07-24 DIAGNOSIS — K30 FUNCTIONAL DYSPEPSIA: ICD-10-CM

## 2025-07-24 DIAGNOSIS — K21.9 GASTROESOPHAGEAL REFLUX DISEASE WITHOUT ESOPHAGITIS: ICD-10-CM

## 2025-07-24 PROCEDURE — 1036F TOBACCO NON-USER: CPT | Performed by: INTERNAL MEDICINE

## 2025-07-24 PROCEDURE — 99213 OFFICE O/P EST LOW 20 MIN: CPT | Performed by: INTERNAL MEDICINE

## 2025-07-24 PROCEDURE — 3008F BODY MASS INDEX DOCD: CPT | Performed by: INTERNAL MEDICINE

## 2025-07-24 RX ORDER — OMEPRAZOLE 40 MG/1
40 CAPSULE, DELAYED RELEASE ORAL DAILY
Qty: 90 CAPSULE | Refills: 3 | Status: SHIPPED | OUTPATIENT
Start: 2025-07-24 | End: 2026-07-24

## 2025-07-24 NOTE — PROGRESS NOTES
[facility] Gastroenterology    ASSESSMENT and PLAN:       Patient is a 53-year-old male presents for follow-up after EGD and colonoscopy        1. Dysphagia  -EGD with no abnormalities to correlate with patient's complaint of dysphagia biopsies negative for any acute pathology  -Esophagram negative for any pathology  - Patient has mitts to some slowing of liquids going through the esophagus manometry was discussed and patient would like to trial PPI if no improvement we will move forward with manometry     2. Functional Dyspepsia   -EGD was negative for any structural etiology biopsies negative for any pathology  -Celiac's panel was negative for celiac's  -We will plan on gastric emptying study to rule out gastroparesis  -Most likely component of functional dyspepsia trial of FD guard patient structured to take as on the box  -Obtain CT abdomen pelvis negative for any acute pathology patient recommended to follow-up with PCP for the findings of SI joint osseous fusion  - Will encourage patient's omeprazole to 40 mg once daily GERD diet was discussed     Patient to call my office if no improvement on 40 mg omeprazole will move forward with manometry.  Patient follow-up in 1 year    Gavin Leslie DO         Gastroenterology    [facility]            Subjective   HISTORY OF PRESENT ILLNESS:     Chief Complaint  No chief complaint on file.    History Of Present Illness:    Julian Juarez is a 55 y.o. male with a significant past medical history of functional dyspepsia, GERD who presents for consultation requested by his primary care provider (Guevara Varghese DO) presents for 1 year follow-up.  Overall patient seems to have some intermittent episodes of epigastric pain and some slow emptying of the esophagus.            Patient denies any heartburn/GERD, N/V, dysphagia, odynophagia, abdominal pain, diarrhea, constipation, hematemesis, hematochezia, melena,  or weight loss.    Review of systems:   Review of Systems      I performed a complete 10 point review of systems and it is negative except as noted in HPI or above.        PAST HISTORIES:       Past Medical History:  He has a past medical history of Dizziness and giddiness (07/21/2022), Personal history of other diseases of the circulatory system, and Personal history of other endocrine, nutritional and metabolic disease.    Past Surgical History:  He has a past surgical history that includes CT angio coronary art with heartflow if score >30% (1/31/2022) and CT angio coronary art with heartflow if score >30% (2/24/2023).      Social History:  He reports that he has never smoked. He has never used smokeless tobacco. He reports that he does not currently use alcohol. He reports that he does not use drugs.    Family History:  No known GI disease, specifically denies pancreatitis, Crohn's, colon cancer, gastroesophageal cancer, or ulcerative colitis.    Family History   Problem Relation Name Age of Onset    Kidney failure Mother      Diabetes type II Father      Nephrolithiasis Father      Sleep apnea Brother      Diabetes Paternal Grandmother      Other (bilateral renal calculus) Paternal Grandmother          Allergies:  Patient has no known allergies.        Objective   OBJECTIVE:       Last Recorded Vitals:  There were no vitals filed for this visit.    There were no vitals taken for this visit.     Physical Exam:    Physical Exam  Constitutional:       Appearance: Normal appearance.   HENT:      Mouth/Throat:      Mouth: Mucous membranes are moist.     Eyes:      Extraocular Movements: Extraocular movements intact.       Cardiovascular:      Rate and Rhythm: Normal rate and regular rhythm.      Heart sounds: Normal heart sounds.   Pulmonary:      Effort: Pulmonary effort is normal. No respiratory distress.      Breath sounds: Normal breath sounds. No wheezing.   Abdominal:      General: Abdomen is flat. Bowel sounds  are normal. There is no distension.      Palpations: Abdomen is soft.      Tenderness: There is no abdominal tenderness. There is no rebound.     Musculoskeletal:         General: Normal range of motion.     Skin:     General: Skin is warm and dry.     Neurological:      General: No focal deficit present.      Mental Status: He is alert and oriented to person, place, and time. Mental status is at baseline.     Psychiatric:         Mood and Affect: Mood normal.         Behavior: Behavior normal.             Home Medications:  Prior to Admission medications    Medication Sig Start Date End Date Taking? Authorizing Provider   ergocalciferol (Vitamin D-2) 1.25 MG (29352 UT) capsule Take 1 capsule by mouth once a week 5/28/24  Yes Guevara Varghese, DO   fluticasone (Flonase) 50 mcg/actuation nasal spray  12/28/23  Yes Historical Provider, MD   levothyroxine (Synthroid, Levoxyl) 112 mcg tablet Take 1 tablet (112 mcg) by mouth once daily. 7/10/23 7/9/24 Yes Guevara Varghese DO   metoprolol tartrate (Lopressor) 50 mg tablet Take 1 tablet (50 mg) by mouth 2 times a day. 7/8/24 7/8/25 Yes Fermín Ann MD   omeprazole (PriLOSEC) 40 mg DR capsule Take 1 capsule by mouth once daily 12/19/23  Yes Gavin Leslie, DO   triamcinolone (Kenalog) 0.1 % cream Thin coat twice daily to affected areas on  for 3-4 weeks, then weekends only. Repeat every few months for flares. 4/3/24  Yes Susan L Mayne, APRN-CNP   metoprolol tartrate (Lopressor) 25 mg tablet Take 1 tablet (25 mg) by mouth 2 times a day. 6/26/24 7/8/24  TONEY Richards         Relevant Results Recent labs reviewed in the EMR.  Lab Results   Component Value Date    HGB 13.9 06/16/2025    HGB 14.9 04/03/2025    HGB 15.7 03/12/2025    HGB 15.4 12/10/2024    MCV 88 06/16/2025    MCV 87 04/03/2025    MCV 90.4 03/12/2025    MCV 87 12/10/2024     06/16/2025     04/03/2025     03/12/2025     12/10/2024       No results found  "for: \"FERRITIN\", \"IRON\"    Lab Results   Component Value Date     06/16/2025     03/12/2025    K 3.8 06/16/2025    K 4.0 03/12/2025     06/16/2025     03/12/2025    BUN 18 06/16/2025    BUN 15 03/12/2025    CREATININE 0.87 06/16/2025    CREATININE 0.79 03/12/2025       Lab Results   Component Value Date    BILITOT 0.3 06/16/2025     Lab Results   Component Value Date    ALT 15 06/16/2025    ALT 18 04/03/2025    ALT 28 03/12/2025    ALT 21 12/10/2024    AST 18 06/16/2025    AST 18 04/03/2025    AST 22 03/12/2025    AST 18 12/10/2024    ALKPHOS 58 06/16/2025    ALKPHOS 55 04/03/2025    ALKPHOS 58 03/12/2025    ALKPHOS 52 12/10/2024       Lab Results   Component Value Date    CRP 0.42 06/16/2025    CRP 0.21 04/03/2025       No results found for: \"CALPS\"    Radiology: Reviewed imaging reviewed in the EMR.  ECG 12 lead (Clinic Performed)    Result Date: 7/8/2024  See scanned report    XR chest 2 views    Result Date: 6/20/2024  * * *Final Report* * * DATE OF EXAM: Jun 20 2024  7:11PM   RHX   [id number]  -  XR CHEST 2V FRONTAL/LAT  / ACCESSION #  [id number] PROCEDURE REASON: Chest Pain      * * * * Physician Interpretation * * * *  EXAMINATION:  CHEST RADIOGRAPH (2 VIEW FRONTAL & LATERAL) CLINICAL HISTORY: Chest Pain MQ:  XC2_6 EXAM DATE/TIME:  6/20/2024 7:11 PM COMPARISON:  No relevant prior studies available. RESULT: Lines, tubes, and devices:  None. Lungs and pleura:  No consolidation. No lung mass. No pleural effusion. No pneumothorax. Cardiomediastinal silhouette:  Normal cardiomediastinal silhouette. Bones and soft tissues:  Unremarkable.    IMPRESSION: No acute radiographic abnormality. : DANIA   Transcribe Date/Time: Jun 20 2024  7:25P Dictated by : LOAN AVILA MD This examination was interpreted and the report reviewed and electronically signed by: LOAN AVILA MD on Jun 20 2024  7:25PM  EST        "

## 2025-07-24 NOTE — PATIENT INSTRUCTIONS
We will increase her omeprazole to 40 mg daily if you have no improvement with her sensation of liquids moving slowly down your esophagus please, office will move forward with esophageal manometry    Will have you follow-up in 1 year      If you have any questions please call my office at [phone].

## 2025-07-31 NOTE — PROGRESS NOTES
Electrophysiology Follow up Visit    History of Present Illness:  Julian Juarez is a 55 y.o. year old male patient with history of atrial fibrillation and SHERMAN.     Patient referred for evaluation of atrial fibrillation.  He was evaluated by Dr Miller in 2020 with reports episodes of atrial fibrillation since ~2010.  Episodes could last 5 minutes to 8 hours and usually occurred at night or early morning.  Initially treated with diltiazem.  OAC not required as [id number]-[id number] score was 0.  He was started on metoprolol though discontinued it at some point in 2023. Monitor placed in June 2024 with very low burden of PAC and PVCs which correlated with patient's symptoms and no atrial fibrillation or SVT. Metoprolol was restarted.     Patient was seen in May 2025 with recurrent palpitations. He was in sinus rhythm at that time and metoprolol was increased.     Patient here for follow up for palpitations.  He reports palpitations improved with the increased metoprolol though still occur randomly.  Palpitations mainly last for few seconds.  He periodically uses [employer] with no indications of atrial fibrillation.    Medical History:  He has a past medical history of Dizziness and giddiness (07/21/2022), Personal history of other diseases of the circulatory system, and Personal history of other endocrine, nutritional and metabolic disease.    Surgical History:  He has a past surgical history that includes CT angio coronary art with heartflow if score >30% (1/31/2022) and CT angio coronary art with heartflow if score >30% (2/24/2023).     Social History:  He reports that he has never smoked. He has never used smokeless tobacco. He reports that he does not currently use alcohol. He reports that he does not use drugs.       Family History[1]     ROS:  A 14 point review of systems was done and is negative other than as stated in HPI    Physical Exam  Constitutional:       Appearance: Normal appearance.     Eyes:       Pupils: Pupils are equal, round, and reactive to light.       Cardiovascular:      Rate and Rhythm: Normal rate and regular rhythm.      Heart sounds: Normal heart sounds.   Pulmonary:      Effort: Pulmonary effort is normal.      Breath sounds: Normal breath sounds.     Musculoskeletal:         General: Normal range of motion.     Skin:     General: Skin is warm and dry.     Neurological:      Mental Status: He is alert and oriented to person, place, and time.       Allergies:  Patient has no known allergies.    Outpatient Medications:  Current Outpatient Medications   Medication Instructions    ergocalciferol (VITAMIN D-2) 1,250 mcg, oral, Once Weekly    levothyroxine (SYNTHROID, LEVOXYL) 125 mcg, oral, Daily    metoprolol tartrate (LOPRESSOR) 50 mg, oral, 2 times daily    omeprazole (PRILOSEC) 40 mg, oral, Daily, Do not crush or chew.    rosuvastatin (CRESTOR) 5 mg, oral, Daily      Reviewed Labs:  CBC  Lab Results   Component Value Date    WBC 6.2 06/16/2025    WBC 5.3 03/12/2025    HGB 13.9 06/16/2025    HGB 15.7 03/12/2025    HCT 41.3 06/16/2025    HCT 46.3 03/12/2025    MCV 88 06/16/2025    MCV 90.4 03/12/2025     06/16/2025     03/12/2025      Renal Function Panel  Lab Results   Component Value Date    GLUCOSE 134 (H) 06/16/2025    GLUCOSE 114 (H) 03/12/2025     06/16/2025     03/12/2025    K 3.8 06/16/2025    K 4.0 03/12/2025     06/16/2025     03/12/2025    CO2 24 06/16/2025    CO2 25 03/12/2025    ANIONGAP 13 06/16/2025    ANIONGAP 8 03/12/2025    BUN 18 06/16/2025    BUN 15 03/12/2025    CREATININE 0.87 06/16/2025    CREATININE 0.79 03/12/2025    GFRMALE >90 09/27/2023    GFRF CANCELED 02/26/2023    CALCIUM 8.7 06/16/2025    CALCIUM 9.2 03/12/2025      CMP  Lab Results   Component Value Date    CALCIUM 8.7 06/16/2025    CALCIUM 9.2 03/12/2025    PROT 6.5 06/16/2025    PROT 6.8 03/12/2025    ALBUMIN 3.9 06/16/2025    ALBUMIN 4.3 03/12/2025    AST 18 06/16/2025    AST  "22 03/12/2025    ALT 15 06/16/2025    ALT 28 03/12/2025    ALKPHOS 58 06/16/2025    ALKPHOS 58 03/12/2025    BILITOT 0.3 06/16/2025    BILITOT 0.8 03/12/2025      Mg   Lab Results   Component Value Date    MG 1.91 02/26/2023      Thyroid  Lab Results   Component Value Date    TSH 7.95 (H) 07/03/2025    FREET4 1.3 07/03/2025    T3FREE 3.3 07/03/2025        Visit Vitals  /82   Pulse 80   Ht 1.778 m (5' 10\")   Wt 98.9 kg (218 lb)   BMI 31.28 kg/m²   Smoking Status Never   BSA 2.21 m²        Cardiac Testing Reviewed Study(s):  Echo (December/2023):   CONCLUSIONS:   1. Left ventricular systolic function is normal with a 55% estimated ejection fraction.   2. Spectral Doppler shows an impaired relaxation pattern of left ventricular diastolic filling.  Monitor (June/2024):    Sinus rhythm with average heart rate 82 ()  <1% PVC burden  1% SVC burden  <1 episode of SVT, 14 beats  No atrial fibrillation  Symptoms associated with PACs and PVCs  Monitor (October/2023):  Average heart rate 81 bpm  <1% SVC burden  <1% PVC burden  No atrial fibrillation  Single SVT of 4 beats  Symptoms associated with brief SVT  ECGs (reviewed and my interpretation):   6/26/2024 sinus rhythm with rate of 79 bpm  5/5/2025 sinus rhythm with rate of 75 bpm  8/4/2025 sinus rhythm with heart rate of 80 bpm     Assessment  Palpitations:  Recurrent palpitations  October 2023 monitor with low burden PACs and PVCs and brief SVT which correlated with symptoms  Started on metoprolol  Patient discontinued in 2024  Repeat monitor for palpitations in June 2024 with no atrial fibrillation and very minimal PACs/PVCs  Metoprolol restarted  May 2025 increased palpitations and increased metoprolol to twice daily    ECG today personally reviewed which shows sinus rhythm. Patient with intermittent palpitations that last a few seconds. No alerts from [facility] for indications of atrial fibrillation.  Encouraged patient to stay well-hydrated.  We will " continue metoprolol twice daily.  Patient will notify office if palpitations become persistent.  Updated echo ordered for later this year.  We will follow-up in 1 year     Atrial fibrillation:  Patient reports diagnosis of atrial fibrillation ~2010, paroxysmal  Initially treated with diltiazem  OAC not required as BPB0YJ1-CDHx score 0    No known recurrence of atrial fibrillation.  OAC not required as [id number] score remains 0.  Patient will continue to use [employer] to assess for any recurrence.     SHERMAN:  Not using CPAP currently       Plan  Continue metoprolol tart 50mg twice daily  Echo ordered for later this year  Follow up in 1 year      Exclusive of any other services or procedures performed, Kathie YANEZ, LINDA-CNP , spent 30 minutes in duration for this visit today.  This time consisted of chart review, obtaining history, and/or performing the exam as documented above as well as documenting the clinical information for the encounter in the electronic record, discussing treatment options, plans, and/or goals with patient, family, and/or caregiver, refilling medications, updating the electronic record, ordering medicines, lab work, imaging, referrals, and/or procedures as documented above and communicating with other [facility] professionals. I have discussed the results of laboratory, radiology, and cardiology studies with the patient and their family/caregiver.          [1]   Family History  Problem Relation Name Age of Onset    Kidney failure Mother      Diabetes type II Father      Nephrolithiasis Father      Sleep apnea Brother      Diabetes Paternal Grandmother      Other (bilateral renal calculus) Paternal Grandmother

## 2025-08-04 ENCOUNTER — OFFICE VISIT (OUTPATIENT)
Dept: CARDIOLOGY | Facility: HOSPITAL | Age: 55
End: 2025-08-04
Payer: MEDICAID

## 2025-08-04 VITALS
HEART RATE: 80 BPM | BODY MASS INDEX: 31.21 KG/M2 | DIASTOLIC BLOOD PRESSURE: 82 MMHG | SYSTOLIC BLOOD PRESSURE: 124 MMHG | WEIGHT: 218 LBS | HEIGHT: 70 IN

## 2025-08-04 DIAGNOSIS — R00.2 PALPITATIONS: Primary | ICD-10-CM

## 2025-08-04 DIAGNOSIS — I48.0 PAROXYSMAL ATRIAL FIBRILLATION (MULTI): ICD-10-CM

## 2025-08-04 PROCEDURE — 3008F BODY MASS INDEX DOCD: CPT | Performed by: NURSE PRACTITIONER

## 2025-08-04 PROCEDURE — 93005 ELECTROCARDIOGRAM TRACING: CPT | Performed by: NURSE PRACTITIONER

## 2025-08-04 PROCEDURE — 99213 OFFICE O/P EST LOW 20 MIN: CPT | Performed by: NURSE PRACTITIONER

## 2025-08-04 PROCEDURE — 1036F TOBACCO NON-USER: CPT | Performed by: NURSE PRACTITIONER

## 2025-08-07 DIAGNOSIS — E06.3 HASHIMOTO'S THYROIDITIS: ICD-10-CM

## 2025-08-10 LAB
ATRIAL RATE: 80 BPM
P AXIS: 45 DEGREES
P OFFSET: 206 MS
P ONSET: 153 MS
PR INTERVAL: 134 MS
Q ONSET: 220 MS
QRS COUNT: 13 BEATS
QRS DURATION: 96 MS
QT INTERVAL: 378 MS
QTC CALCULATION(BAZETT): 435 MS
QTC FREDERICIA: 416 MS
R AXIS: 36 DEGREES
T AXIS: 58 DEGREES
T OFFSET: 409 MS
VENTRICULAR RATE: 80 BPM

## 2025-08-21 ENCOUNTER — APPOINTMENT (OUTPATIENT)
Dept: SLEEP MEDICINE | Facility: CLINIC | Age: 55
End: 2025-08-21
Payer: MEDICAID

## 2025-10-14 ENCOUNTER — APPOINTMENT (OUTPATIENT)
Dept: NEUROLOGY | Facility: CLINIC | Age: 55
End: 2025-10-14
Payer: MEDICAID